# Patient Record
Sex: MALE | Race: WHITE | NOT HISPANIC OR LATINO | Employment: OTHER | URBAN - METROPOLITAN AREA
[De-identification: names, ages, dates, MRNs, and addresses within clinical notes are randomized per-mention and may not be internally consistent; named-entity substitution may affect disease eponyms.]

---

## 2021-12-03 ENCOUNTER — HOSPITAL ENCOUNTER (OUTPATIENT)
Dept: GENERAL RADIOLOGY | Facility: HOSPITAL | Age: 56
Discharge: HOME | End: 2021-12-03
Admitting: STUDENT IN AN ORGANIZED HEALTH CARE EDUCATION/TRAINING PROGRAM

## 2021-12-03 ENCOUNTER — OFFICE VISIT (OUTPATIENT)
Dept: PAIN MEDICINE | Facility: CLINIC | Age: 56
End: 2021-12-03

## 2021-12-03 VITALS
DIASTOLIC BLOOD PRESSURE: 96 MMHG | BODY MASS INDEX: 24.99 KG/M2 | OXYGEN SATURATION: 97 % | RESPIRATION RATE: 16 BRPM | HEIGHT: 65 IN | HEART RATE: 68 BPM | WEIGHT: 150 LBS | TEMPERATURE: 97.3 F | SYSTOLIC BLOOD PRESSURE: 169 MMHG

## 2021-12-03 DIAGNOSIS — M54.41 CHRONIC BILATERAL LOW BACK PAIN WITH BILATERAL SCIATICA: ICD-10-CM

## 2021-12-03 DIAGNOSIS — M54.42 CHRONIC BILATERAL LOW BACK PAIN WITH BILATERAL SCIATICA: ICD-10-CM

## 2021-12-03 DIAGNOSIS — G89.29 CHRONIC BILATERAL LOW BACK PAIN WITH BILATERAL SCIATICA: ICD-10-CM

## 2021-12-03 DIAGNOSIS — Z79.899 MEDICATION MANAGEMENT: Primary | ICD-10-CM

## 2021-12-03 PROCEDURE — G0463 HOSPITAL OUTPT CLINIC VISIT: HCPCS | Performed by: STUDENT IN AN ORGANIZED HEALTH CARE EDUCATION/TRAINING PROGRAM

## 2021-12-03 PROCEDURE — 99204 OFFICE O/P NEW MOD 45 MIN: CPT | Performed by: STUDENT IN AN ORGANIZED HEALTH CARE EDUCATION/TRAINING PROGRAM

## 2021-12-03 PROCEDURE — 72100 X-RAY EXAM L-S SPINE 2/3 VWS: CPT

## 2021-12-03 RX ORDER — MORPHINE SULFATE 15 MG/1
15 TABLET, FILM COATED, EXTENDED RELEASE ORAL 3 TIMES DAILY
COMMUNITY
Start: 2021-11-09 | End: 2021-12-03

## 2021-12-03 RX ORDER — BUPRENORPHINE 7.5 UG/H
1 PATCH TRANSDERMAL
Qty: 4 PATCH | Refills: 0 | Status: SHIPPED | OUTPATIENT
Start: 2021-12-03 | End: 2021-12-29 | Stop reason: SDUPTHER

## 2021-12-06 ENCOUNTER — TELEPHONE (OUTPATIENT)
Dept: PAIN MEDICINE | Facility: CLINIC | Age: 56
End: 2021-12-06

## 2021-12-08 NOTE — PROGRESS NOTES
CHIEF COMPLAINT  Chief Complaint   Patient presents with   • Back Pain     MORPHINE LAST DOSE 12/3/21 0800.        Primary Care  Deni Mahoney APRN Subjective Raymond V Tristian is a 56 y.o. male  who presents for chronic low back pain and bilateral lumbar radiculopathy.  He states that he is on the pain ongoing for many years following a work injury.  He states that since that time, he has been followed by an outside pain clinic who has started him on multiple medications including Percocet and most recently, MS Contin.  He states that while he does get some relief with his current pain regimen, he feels like he has less benefit than when he was getting fentanyl patches.  He states that he was told by his previous pain provider that he could no longer get the fentanyl due to supply issues.  He presents today for establishment of care and further work-up management.  He denies any red flag symptoms, but does admit to generalized weakness in the lower extremities bilaterally.    History of Present Illness     Location: Low back and bilateral lower extremities  Onset: Many years ago  Duration: Progressively worsening  Timing: Constant throughout the day  Quality: Sharp, stabbing pains with associated weakness  Severity: Today: 9       Last Week: 9       Worst: 10  Modifying Factors: The pain is worse with any physical activity and movement and improves with rest    Physical Therapy: no    Interval Update 12/03/2021:     The following portions of the patient's history were reviewed and updated as appropriate: allergies, current medications, past family history, past medical history, past social history, past surgical history and problem list.      Current Outpatient Medications:   •  Buprenorphine (Butrans) patch weekly transdermal patch, Place 1 patch on the skin as directed by provider Every 7 (Seven) Days., Disp: 4 patch, Rfl: 0    Review of Systems   Gastrointestinal: Negative for constipation.  "  Musculoskeletal: Positive for back pain and gait problem.   Neurological: Positive for weakness. Negative for numbness.       Vitals:    12/03/21 1505   BP: 169/96   Pulse: 68   Resp: 16   Temp: 97.3 °F (36.3 °C)   SpO2: 97%   Weight: 68 kg (150 lb)   Height: 165.1 cm (65\")   PainSc:   9       Urine Drug Screen: 12/03/2021  Appropriate: Pending    Objective   Physical Exam  Vitals and nursing note reviewed. Exam conducted with a chaperone present.   Constitutional:       General: He is not in acute distress.     Appearance: Normal appearance. He is well-developed and normal weight.   Neck:      Trachea: No tracheal deviation.   Musculoskeletal:      Comments: Lumbar Spine Exam:  Tender to palpation over the lumbar paraspinal musculature No  Limited range of motion secondary to pain No  Facet loading positive: Equivocal  Facets tender to palpation: Equivocal  Straight leg raise test positive: Equivocal       Neurological:      General: No focal deficit present.      Mental Status: He is alert.      Sensory: No sensory deficit.      Motor: Weakness present.      Comments: Generalized global weakness           Assessment/Plan   Problems Addressed this Visit     None      Visit Diagnoses     Medication management    -  Primary    Relevant Medications    Buprenorphine (Butrans) patch weekly transdermal patch    Other Relevant Orders    Urine Drug Screen - Urine, Clean Catch    Chronic bilateral low back pain with bilateral sciatica        Relevant Medications    Buprenorphine (Butrans) patch weekly transdermal patch    Other Relevant Orders    XR Spine Lumbar 2 or 3 View (Completed)      Diagnoses       Codes Comments    Medication management    -  Primary ICD-10-CM: Z79.899  ICD-9-CM: V58.69     Chronic bilateral low back pain with bilateral sciatica     ICD-10-CM: M54.42, M54.41, G89.29  ICD-9-CM: 724.2, 724.3, 338.29           Plan:  1. We will obtain plain films of the lumbar spine to assess for his current " pathology  2. He was previously established with outside pain clinic and was receiving it combination of different medications with varying benefit  3. It does not appear that he has ever received any formal interventional therapy or neurosurgical work-up  4. Following the plain films, may consider MRI of the lumbar spine to better assess for his pathology.  Given his weakness, I am somewhat concerned of either spinal stenosis versus nerve root impingement  5. He states in the past, he received good benefit with fentanyl patches.  Ideally, I would like to minimize his narcotic burden as it does not appear that he has had any interventional techniques  6. We will try him on Butrans patches pending the insurance approval.  If he gets good relief with Butrans patches, we can discontinue the morphine and maintain on Butrans patch that we can get a further working diagnosis  7. UDS and contract today  8. Inspect appropriate  --- Follow-up 1 month           INSPECT REPORT    As part of the patient's treatment plan, I may be prescribing controlled substances. The patient has been made aware of appropriate use of such medications, including potential risk of somnolence, limited ability to drive and/or work safely, and the potential for dependence or overdose. It has also bee made clear that these medications are for use by this patient only, without concomitant use of alcohol or other substances unless prescribed.     Patient has completed prescribing agreement detailing terms of continued prescribing of controlled substances, including monitoring HANNY reports, urine drug screening, and pill counts if necessary. The patient is aware that inappropriate use will results in cessation of prescribing such medications.    INSPECT report has been reviewed and scanned into the patient's chart.    As the clinician, I personally reviewed the INSPECT from 12/3/2021.    History and physical exam exhibit continued safe and appropriate  use of controlled substances.      EMR Dragon/Transcription disclaimer:   Much of this encounter note is an electronic transcription/translation of spoken language to printed text. The electronic translation of spoken language may permit erroneous, or at times, nonsensical words or phrases to be inadvertently transcribed; Although I have reviewed the note for such errors, some may still exist.

## 2021-12-29 DIAGNOSIS — G89.29 CHRONIC BILATERAL LOW BACK PAIN WITH BILATERAL SCIATICA: ICD-10-CM

## 2021-12-29 DIAGNOSIS — M54.41 CHRONIC BILATERAL LOW BACK PAIN WITH BILATERAL SCIATICA: ICD-10-CM

## 2021-12-29 DIAGNOSIS — Z79.899 MEDICATION MANAGEMENT: ICD-10-CM

## 2021-12-29 DIAGNOSIS — M54.42 CHRONIC BILATERAL LOW BACK PAIN WITH BILATERAL SCIATICA: ICD-10-CM

## 2021-12-29 RX ORDER — BUPRENORPHINE 7.5 UG/H
1 PATCH TRANSDERMAL
Qty: 4 PATCH | Refills: 0 | Status: SHIPPED | OUTPATIENT
Start: 2021-12-29 | End: 2022-02-25

## 2022-01-14 ENCOUNTER — OFFICE VISIT (OUTPATIENT)
Dept: PAIN MEDICINE | Facility: CLINIC | Age: 57
End: 2022-01-14

## 2022-01-14 ENCOUNTER — TELEPHONE (OUTPATIENT)
Dept: PAIN MEDICINE | Facility: CLINIC | Age: 57
End: 2022-01-14

## 2022-01-14 VITALS
BODY MASS INDEX: 24.99 KG/M2 | HEIGHT: 65 IN | OXYGEN SATURATION: 97 % | SYSTOLIC BLOOD PRESSURE: 139 MMHG | DIASTOLIC BLOOD PRESSURE: 85 MMHG | WEIGHT: 150 LBS | HEART RATE: 66 BPM | RESPIRATION RATE: 16 BRPM

## 2022-01-14 DIAGNOSIS — Z79.899 MEDICATION MANAGEMENT: ICD-10-CM

## 2022-01-14 DIAGNOSIS — M54.42 CHRONIC BILATERAL LOW BACK PAIN WITH BILATERAL SCIATICA: ICD-10-CM

## 2022-01-14 DIAGNOSIS — M54.41 CHRONIC BILATERAL LOW BACK PAIN WITH BILATERAL SCIATICA: ICD-10-CM

## 2022-01-14 DIAGNOSIS — G89.29 CHRONIC BILATERAL LOW BACK PAIN WITH BILATERAL SCIATICA: ICD-10-CM

## 2022-01-14 PROCEDURE — 99214 OFFICE O/P EST MOD 30 MIN: CPT | Performed by: STUDENT IN AN ORGANIZED HEALTH CARE EDUCATION/TRAINING PROGRAM

## 2022-01-14 PROCEDURE — G0463 HOSPITAL OUTPT CLINIC VISIT: HCPCS | Performed by: STUDENT IN AN ORGANIZED HEALTH CARE EDUCATION/TRAINING PROGRAM

## 2022-01-14 RX ORDER — ASPIRIN 325 MG
325 TABLET ORAL DAILY
COMMUNITY

## 2022-01-14 RX ORDER — BUPRENORPHINE 10 UG/H
1 PATCH TRANSDERMAL WEEKLY
Qty: 4 PATCH | Refills: 0 | Status: SHIPPED | OUTPATIENT
Start: 2022-02-03 | End: 2022-01-18 | Stop reason: SDUPTHER

## 2022-01-14 NOTE — TELEPHONE ENCOUNTER
Caller: JESSICA     Relationship to patient: [facility]     Best call back number: [phone]    Patient is needing: ATTEMPTED TO WARM TRANSFER- JESSICA CALLED REGARDING A PRESCRIPTION  PHARMACY RECEIVED; STATED OF A DIFFERENT STRENGTH & HAD  QUESTIONS-  PLEASE RETURN CALL

## 2022-01-14 NOTE — PROGRESS NOTES
CHIEF COMPLAINT  Chief Complaint   Patient presents with   • Back Pain     Butrans 1/13 morning       Primary Care  Deni Mahoney, LOAN Rollins V Tristian is a 56 y.o. male  who presents for chronic low back pain and bilateral lumbar radiculopathy.  He states that he is on the pain ongoing for many years following a work injury.  He states that since that time, he has been followed by an outside pain clinic who has started him on multiple medications including Percocet and most recently, MS Contin.  He states that while he does get some relief with his current pain regimen, he feels like he has less benefit than when he was getting fentanyl patches.  He states that he was told by his previous pain provider that he could no longer get the fentanyl due to supply issues.  He presents today for establishment of care and further work-up management.  He denies any red flag symptoms, but does admit to generalized weakness in the lower extremities bilaterally.    Back Pain  Associated symptoms include weakness. Pertinent negatives include no numbness.        Location: Low back and bilateral lower extremities  Onset: Many years ago  Duration: Progressively worsening  Timing: Constant throughout the day  Quality: Sharp, stabbing pains with associated weakness  Severity: Today: 9       Last Week: 9       Worst: 10  Modifying Factors: The pain is worse with any physical activity and movement and improves with rest    Physical Therapy: no    Interval Update 01/14/2022: He reports doing much better with the Butrans patch over the morphine.  Denies side effects of sedation constipation.    The following portions of the patient's history were reviewed and updated as appropriate: allergies, current medications, past family history, past medical history, past social history, past surgical history and problem list.      Current Outpatient Medications:   •  aspirin 325 MG tablet, Take 325 mg by mouth Daily., Disp: , Rfl:  "  •  Buprenorphine (Butrans) patch weekly transdermal patch, Place 1 patch on the skin as directed by provider Every 7 (Seven) Days., Disp: 4 patch, Rfl: 0  •  [START ON 2/3/2022] Buprenorphine (Butrans) 10 MCG/HR patch weekly, Place 1 patch on the skin as directed by provider 1 (One) Time Per Week., Disp: 4 patch, Rfl: 0    Review of Systems   Gastrointestinal: Negative for constipation.   Musculoskeletal: Positive for back pain and gait problem.   Neurological: Positive for weakness. Negative for numbness.       Vitals:    01/14/22 1405   BP: 139/85   Pulse: 66   Resp: 16   SpO2: 97%   Weight: 68 kg (150 lb)   Height: 165.1 cm (65\")   PainSc:   8       Urine Drug Screen: 12/03/2021  Appropriate: Yes    Objective   Physical Exam  Vitals and nursing note reviewed. Exam conducted with a chaperone present.   Constitutional:       General: He is not in acute distress.     Appearance: Normal appearance. He is well-developed and normal weight.   Neck:      Trachea: No tracheal deviation.   Musculoskeletal:      Comments: Lumbar Spine Exam:  Tender to palpation over the lumbar paraspinal musculature No  Limited range of motion secondary to pain No  Facet loading positive: Equivocal  Facets tender to palpation: Equivocal  Straight leg raise test positive: Equivocal       Neurological:      General: No focal deficit present.      Mental Status: He is alert.      Sensory: No sensory deficit.      Motor: Weakness present.      Comments: Generalized global weakness           Assessment/Plan   Problems Addressed this Visit     None      Visit Diagnoses     Medication management        Chronic bilateral low back pain with bilateral sciatica        Relevant Medications    Buprenorphine (Butrans) 10 MCG/HR patch weekly (Start on 2/3/2022)    Other Relevant Orders    MRI Lumbar Spine Without Contrast      Diagnoses       Codes Comments    Medication management     ICD-10-CM: Z79.899  ICD-9-CM: V58.69     Chronic bilateral low back " pain with bilateral sciatica     ICD-10-CM: M54.42, M54.41, G89.29  ICD-9-CM: 724.2, 724.3, 338.29           Plan:  1. His lumbar plain films are fairly unremarkable  2. Order lumbar MRI  3. Increase Butrans patch to 10 mcg/h  4. Follow-up in 1 month  --- Follow-up 4 to 6 weeks           INSPECT REPORT    As part of the patient's treatment plan, I may be prescribing controlled substances. The patient has been made aware of appropriate use of such medications, including potential risk of somnolence, limited ability to drive and/or work safely, and the potential for dependence or overdose. It has also bee made clear that these medications are for use by this patient only, without concomitant use of alcohol or other substances unless prescribed.     Patient has completed prescribing agreement detailing terms of continued prescribing of controlled substances, including monitoring HANNY reports, urine drug screening, and pill counts if necessary. The patient is aware that inappropriate use will results in cessation of prescribing such medications.    INSPECT report has been reviewed and scanned into the patient's chart.    As the clinician, I personally reviewed the INSPECT from 1/12/2022.    History and physical exam exhibit continued safe and appropriate use of controlled substances.      EMR Dragon/Transcription disclaimer:   Much of this encounter note is an electronic transcription/translation of spoken language to printed text. The electronic translation of spoken language may permit erroneous, or at times, nonsensical words or phrases to be inadvertently transcribed; Although I have reviewed the note for such errors, some may still exist.

## 2022-01-17 ENCOUNTER — TELEPHONE (OUTPATIENT)
Dept: PAIN MEDICINE | Facility: CLINIC | Age: 57
End: 2022-01-17

## 2022-01-17 NOTE — TELEPHONE ENCOUNTER
Caller: KIMBERLY GAY     Relationship to patient: WIFE     Best call back number: [phone]    Patient is needing: PATIENTS WIFE CALLED AND STATED THAT THE PAIN PATCH( Buprenorphine (Butrans) 10 MCG/HR patch weekly) WOULD NEED TO BE FILLED ON 02/02/2022 INSTEAD OF 02/03/2022 SO THE PATIENT DOES NOT RUN OUT OF MEDICATION. ATTEMPTED TO WARM TRANSFER

## 2022-01-17 NOTE — TELEPHONE ENCOUNTER
Spoke with wife wanting to fill a day early since pharmacy is [place] miles from where they live and patient puts patch on at 9:30 AM. Wife would not have time to get patch and put on that AM with pharmacy being so far from house.

## 2022-01-18 DIAGNOSIS — G89.29 CHRONIC BILATERAL LOW BACK PAIN WITH BILATERAL SCIATICA: ICD-10-CM

## 2022-01-18 DIAGNOSIS — M54.42 CHRONIC BILATERAL LOW BACK PAIN WITH BILATERAL SCIATICA: ICD-10-CM

## 2022-01-18 DIAGNOSIS — M54.41 CHRONIC BILATERAL LOW BACK PAIN WITH BILATERAL SCIATICA: ICD-10-CM

## 2022-01-18 RX ORDER — BUPRENORPHINE 10 UG/H
1 PATCH TRANSDERMAL WEEKLY
Qty: 4 PATCH | Refills: 0 | Status: SHIPPED | OUTPATIENT
Start: 2022-02-02 | End: 2022-02-25

## 2022-02-09 ENCOUNTER — HOSPITAL ENCOUNTER (OUTPATIENT)
Dept: MRI IMAGING | Facility: HOSPITAL | Age: 57
Discharge: HOME | End: 2022-02-09
Admitting: STUDENT IN AN ORGANIZED HEALTH CARE EDUCATION/TRAINING PROGRAM

## 2022-02-09 DIAGNOSIS — G89.29 CHRONIC BILATERAL LOW BACK PAIN WITH BILATERAL SCIATICA: ICD-10-CM

## 2022-02-09 DIAGNOSIS — M54.41 CHRONIC BILATERAL LOW BACK PAIN WITH BILATERAL SCIATICA: ICD-10-CM

## 2022-02-09 DIAGNOSIS — M54.42 CHRONIC BILATERAL LOW BACK PAIN WITH BILATERAL SCIATICA: ICD-10-CM

## 2022-02-09 PROCEDURE — 72148 MRI LUMBAR SPINE W/O DYE: CPT

## 2022-02-25 ENCOUNTER — OFFICE VISIT (OUTPATIENT)
Dept: PAIN MEDICINE | Facility: CLINIC | Age: 57
End: 2022-02-25

## 2022-02-25 VITALS
HEART RATE: 77 BPM | TEMPERATURE: 97.1 F | SYSTOLIC BLOOD PRESSURE: 151 MMHG | DIASTOLIC BLOOD PRESSURE: 91 MMHG | OXYGEN SATURATION: 98 % | RESPIRATION RATE: 16 BRPM

## 2022-02-25 DIAGNOSIS — M48.062 SPINAL STENOSIS OF LUMBAR REGION WITH NEUROGENIC CLAUDICATION: Primary | ICD-10-CM

## 2022-02-25 PROCEDURE — 99214 OFFICE O/P EST MOD 30 MIN: CPT | Performed by: STUDENT IN AN ORGANIZED HEALTH CARE EDUCATION/TRAINING PROGRAM

## 2022-02-25 RX ORDER — BUPRENORPHINE 15 UG/H
1 PATCH TRANSDERMAL WEEKLY
Qty: 4 PATCH | Refills: 0 | Status: SHIPPED | OUTPATIENT
Start: 2022-04-27 | End: 2022-05-20 | Stop reason: SDUPTHER

## 2022-02-25 RX ORDER — BUPRENORPHINE 15 UG/H
1 PATCH TRANSDERMAL WEEKLY
Qty: 4 PATCH | Refills: 0 | Status: SHIPPED | OUTPATIENT
Start: 2022-03-02 | End: 2022-05-20 | Stop reason: SDUPTHER

## 2022-02-25 RX ORDER — BUPRENORPHINE 15 UG/H
1 PATCH TRANSDERMAL WEEKLY
Qty: 4 PATCH | Refills: 0 | Status: SHIPPED | OUTPATIENT
Start: 2022-03-30 | End: 2022-05-20 | Stop reason: SDUPTHER

## 2022-02-25 NOTE — PROGRESS NOTES
CHIEF COMPLAINT  Chief Complaint   Patient presents with   • Back Pain     butrans 0930 yesterday       Primary Care  Deni Mahoney, LOAN Rollins V Tristian is a 56 y.o. male  who presents for chronic low back pain and bilateral lumbar radiculopathy.  He states that he is on the pain ongoing for many years following a work injury.  He states that since that time, he has been followed by an outside pain clinic who has started him on multiple medications including Percocet and most recently, MS Contin.  He states that while he does get some relief with his current pain regimen, he feels like he has less benefit than when he was getting fentanyl patches.  He states that he was told by his previous pain provider that he could no longer get the fentanyl due to supply issues.  He presents today for establishment of care and further work-up management.  He denies any red flag symptoms, but does admit to generalized weakness in the lower extremities bilaterally.    Back Pain  Associated symptoms include weakness. Pertinent negatives include no numbness.        Location: Low back and bilateral lower extremities  Onset: Many years ago  Duration: Progressively worsening  Timing: Constant throughout the day  Quality: Sharp, stabbing pains with associated weakness  Severity: Today: 9       Last Week: 9       Worst: 10  Modifying Factors: The pain is worse with any physical activity and movement and improves with rest    Physical Therapy: no    Interval Update 02/25/2022: He continues to do very well with Butrans patch.  Is having minimal breakthrough pain.  Much better symptom control.    The following portions of the patient's history were reviewed and updated as appropriate: allergies, current medications, past family history, past medical history, past social history, past surgical history and problem list.      Current Outpatient Medications:   •  aspirin 325 MG tablet, Take 325 mg by mouth Daily., Disp: ,  Rfl:   •  [START ON 3/2/2022] Buprenorphine (Butrans) 15 MCG/HR patch weekly, Place 1 patch on the skin as directed by provider 1 (One) Time Per Week., Disp: 4 patch, Rfl: 0  •  [START ON 3/30/2022] Buprenorphine (Butrans) 15 MCG/HR patch weekly, Place 1 patch on the skin as directed by provider 1 (One) Time Per Week., Disp: 4 patch, Rfl: 0  •  [START ON 4/27/2022] Buprenorphine (Butrans) 15 MCG/HR patch weekly, Place 1 patch on the skin as directed by provider 1 (One) Time Per Week., Disp: 4 patch, Rfl: 0    Review of Systems   Gastrointestinal: Negative for constipation.   Musculoskeletal: Positive for back pain and gait problem.   Neurological: Positive for weakness. Negative for numbness.       Vitals:    02/25/22 1357   BP: 151/91   Pulse: 77   Resp: 16   Temp: 97.1 °F (36.2 °C)   SpO2: 98%   PainSc:   8       Urine Drug Screen: 12/03/2021  Appropriate: Yes    Objective   Physical Exam  Vitals and nursing note reviewed. Exam conducted with a chaperone present.   Constitutional:       General: He is not in acute distress.     Appearance: Normal appearance. He is well-developed and normal weight.   Neck:      Trachea: No tracheal deviation.   Musculoskeletal:      Comments: Lumbar Spine Exam:  Tender to palpation over the lumbar paraspinal musculature No  Limited range of motion secondary to pain No  Facet loading positive: Equivocal  Facets tender to palpation: Equivocal  Straight leg raise test positive: Equivocal       Neurological:      General: No focal deficit present.      Mental Status: He is alert.      Sensory: No sensory deficit.      Motor: Weakness present.      Comments: Generalized global weakness           Assessment/Plan   Problems Addressed this Visit     None      Visit Diagnoses     Spinal stenosis of lumbar region with neurogenic claudication    -  Primary    Relevant Medications    Buprenorphine (Butrans) 15 MCG/HR patch weekly (Start on 3/2/2022)    Buprenorphine (Butrans) 15 MCG/HR patch  weekly (Start on 3/30/2022)    Buprenorphine (Butrans) 15 MCG/HR patch weekly (Start on 4/27/2022)      Diagnoses       Codes Comments    Spinal stenosis of lumbar region with neurogenic claudication    -  Primary ICD-10-CM: M48.062  ICD-9-CM: 724.03           Plan:  1. His lumbar plain films are fairly unremarkable  2. MRI review shows lower lumbar central and foraminal stenosis but is otherwise fairly unremarkable  3. Increase Butrans patch 1 final time to 15 mcg/h  4. Follow-up 3 months  --- Follow-up 3 months           INSPECT REPORT    As part of the patient's treatment plan, I may be prescribing controlled substances. The patient has been made aware of appropriate use of such medications, including potential risk of somnolence, limited ability to drive and/or work safely, and the potential for dependence or overdose. It has also bee made clear that these medications are for use by this patient only, without concomitant use of alcohol or other substances unless prescribed.     Patient has completed prescribing agreement detailing terms of continued prescribing of controlled substances, including monitoring HANNY reports, urine drug screening, and pill counts if necessary. The patient is aware that inappropriate use will results in cessation of prescribing such medications.    INSPECT report has been reviewed and scanned into the patient's chart.    As the clinician, I personally reviewed the INSPECT from 2/23/2022.    History and physical exam exhibit continued safe and appropriate use of controlled substances.      EMR Dragon/Transcription disclaimer:   Much of this encounter note is an electronic transcription/translation of spoken language to printed text. The electronic translation of spoken language may permit erroneous, or at times, nonsensical words or phrases to be inadvertently transcribed; Although I have reviewed the note for such errors, some may still exist.

## 2022-03-30 ENCOUNTER — TELEPHONE (OUTPATIENT)
Dept: PAIN MEDICINE | Facility: CLINIC | Age: 57
End: 2022-03-30

## 2022-03-30 NOTE — TELEPHONE ENCOUNTER
PA. Sent waiting on insurance approval.   patient found on the floor by the bed by staff- send in to be evealuated

## 2022-03-30 NOTE — TELEPHONE ENCOUNTER
Caller: JESSICA    Relationship to patient: [facility]    Best call back number: [phone]    Patient is needing: JESSICA WITH [facility] WAS CALLING TO LET DR. FIGUEROA KNOW THAT PRIOR AUTHORIZATION IS NOW NEEDED FOR PATIENT'S PRESCRIPTION OF BUPRENOPHINE 15 MG. JESSICA STATED SHE FAXED THIS INFORMATION OVER AS WELL BUT THE PRESCRIPTION IS DUE TO BE REFILLED TODAY SO SHE WANTED TO CALL US AS WELL TO INFORM ABOUT THE PRIOR AUTHORIZATION. I TRIED TO WARM TRANSFER CALL BUT RECEIVED NO ANSWER. THANK YOU!

## 2022-04-21 ENCOUNTER — TELEPHONE (OUTPATIENT)
Dept: PAIN MEDICINE | Facility: CLINIC | Age: 57
End: 2022-04-21

## 2022-04-21 NOTE — TELEPHONE ENCOUNTER
Caller: KIMBERLY     Relationship: SPOUSE    Best call back number: [phone]    Requested Prescriptions: BUPRENORPHINE 15 MG  PATCHES    Pharmacy where request should be sent:  [facility] IN [place], [place]    Additional details provided by patient: PATIENT'S SPOUSE, KIMBERLY WAS CALLING TO REQUEST A REFILL ON PATIENT'S PRESCRIPTION OF BUPRENORPHINE 15 MG PATCHES.     Does the patient have less than a 3 day supply:  [] Yes  [x] No

## 2022-05-20 ENCOUNTER — OFFICE VISIT (OUTPATIENT)
Dept: PAIN MEDICINE | Facility: CLINIC | Age: 57
End: 2022-05-20

## 2022-05-20 VITALS
HEART RATE: 73 BPM | HEIGHT: 65 IN | SYSTOLIC BLOOD PRESSURE: 149 MMHG | DIASTOLIC BLOOD PRESSURE: 92 MMHG | BODY MASS INDEX: 24.99 KG/M2 | OXYGEN SATURATION: 97 % | RESPIRATION RATE: 16 BRPM | WEIGHT: 150 LBS

## 2022-05-20 DIAGNOSIS — M48.062 SPINAL STENOSIS OF LUMBAR REGION WITH NEUROGENIC CLAUDICATION: ICD-10-CM

## 2022-05-20 PROCEDURE — G0463 HOSPITAL OUTPT CLINIC VISIT: HCPCS | Performed by: STUDENT IN AN ORGANIZED HEALTH CARE EDUCATION/TRAINING PROGRAM

## 2022-05-20 PROCEDURE — 99214 OFFICE O/P EST MOD 30 MIN: CPT | Performed by: STUDENT IN AN ORGANIZED HEALTH CARE EDUCATION/TRAINING PROGRAM

## 2022-05-20 RX ORDER — BUPRENORPHINE 15 UG/H
1 PATCH TRANSDERMAL WEEKLY
Qty: 4 PATCH | Refills: 0 | Status: SHIPPED | OUTPATIENT
Start: 2022-06-22 | End: 2022-08-12 | Stop reason: SDUPTHER

## 2022-05-20 RX ORDER — BUPRENORPHINE 15 UG/H
1 PATCH TRANSDERMAL WEEKLY
Qty: 4 PATCH | Refills: 0 | Status: SHIPPED | OUTPATIENT
Start: 2022-05-25 | End: 2022-08-12 | Stop reason: SDUPTHER

## 2022-05-20 RX ORDER — BUPRENORPHINE 15 UG/H
1 PATCH TRANSDERMAL WEEKLY
Qty: 4 PATCH | Refills: 0 | Status: SHIPPED | OUTPATIENT
Start: 2022-07-20 | End: 2022-08-12 | Stop reason: SDUPTHER

## 2022-05-20 NOTE — PROGRESS NOTES
CHIEF COMPLAINT  Chief Complaint   Patient presents with   • Back Pain     Butrans patch 5/19       Primary Care  Deni Mahoney, LOAN Rollins V Tristian is a 56 y.o. male  who presents for chronic low back pain and bilateral lumbar radiculopathy.  He states that he is on the pain ongoing for many years following a work injury.  He states that since that time, he has been followed by an outside pain clinic who has started him on multiple medications including Percocet and most recently, MS Contin.  He states that while he does get some relief with his current pain regimen, he feels like he has less benefit than when he was getting fentanyl patches.  He states that he was told by his previous pain provider that he could no longer get the fentanyl due to supply issues.  He presents today for establishment of care and further work-up management.  He denies any red flag symptoms, but does admit to generalized weakness in the lower extremities bilaterally.    Back Pain  Associated symptoms include weakness. Pertinent negatives include no numbness.        Location: Low back and bilateral lower extremities  Onset: Many years ago  Duration: Progressively worsening  Timing: Constant throughout the day  Quality: Sharp, stabbing pains with associated weakness  Severity: Today: 6       Last Week: 6       Worst: 10  Modifying Factors: The pain is worse with any physical activity and movement and improves with rest    Physical Therapy: no    Interval Update 05/20/2022: He continues to do very well with Butrans patch.  Is having minimal breakthrough pain.  Much better symptom control.    The following portions of the patient's history were reviewed and updated as appropriate: allergies, current medications, past family history, past medical history, past social history, past surgical history and problem list.      Current Outpatient Medications:   •  aspirin 325 MG tablet, Take 325 mg by mouth Daily., Disp: , Rfl:  "  •  [START ON 5/25/2022] Buprenorphine (Butrans) 15 MCG/HR patch weekly, Place 1 patch on the skin as directed by provider 1 (One) Time Per Week., Disp: 4 patch, Rfl: 0  •  [START ON 6/22/2022] Buprenorphine (Butrans) 15 MCG/HR patch weekly, Place 1 patch on the skin as directed by provider 1 (One) Time Per Week., Disp: 4 patch, Rfl: 0  •  [START ON 7/20/2022] Buprenorphine (Butrans) 15 MCG/HR patch weekly, Place 1 patch on the skin as directed by provider 1 (One) Time Per Week., Disp: 4 patch, Rfl: 0    Review of Systems   Gastrointestinal: Negative for constipation.   Musculoskeletal: Positive for back pain and gait problem.   Neurological: Positive for weakness. Negative for numbness.       Vitals:    05/20/22 1413   BP: 149/92   Pulse: 73   Resp: 16   SpO2: 97%   Weight: 68 kg (150 lb)   Height: 165.1 cm (65\")   PainSc:   6       Urine Drug Screen: 12/03/2021  Appropriate: Yes    Objective   Physical Exam  Vitals and nursing note reviewed. Exam conducted with a chaperone present.   Constitutional:       General: He is not in acute distress.     Appearance: Normal appearance. He is well-developed and normal weight.   Neck:      Trachea: No tracheal deviation.   Musculoskeletal:      Comments: Lumbar Spine Exam:  Tender to palpation over the lumbar paraspinal musculature No  Limited range of motion secondary to pain No  Facet loading positive: Equivocal  Facets tender to palpation: Equivocal  Straight leg raise test positive: Equivocal       Neurological:      General: No focal deficit present.      Mental Status: He is alert.      Sensory: No sensory deficit.      Motor: Weakness present.      Comments: Generalized global weakness           Assessment & Plan   Problems Addressed this Visit    None     Visit Diagnoses     Spinal stenosis of lumbar region with neurogenic claudication        Relevant Medications    Buprenorphine (Butrans) 15 MCG/HR patch weekly (Start on 5/25/2022)    Buprenorphine (Butrans) 15 " MCG/HR patch weekly (Start on 6/22/2022)    Buprenorphine (Butrans) 15 MCG/HR patch weekly (Start on 7/20/2022)      Diagnoses       Codes Comments    Spinal stenosis of lumbar region with neurogenic claudication     ICD-10-CM: M48.062  ICD-9-CM: 724.03           Plan:  1. His lumbar plain films are fairly unremarkable  2. MRI review shows lower lumbar central and foraminal stenosis but is otherwise fairly unremarkable  3. Continue Butrans at 50 mcg/h  4. Follow-up 3 months  --- Follow-up 3 months           INSPECT REPORT    As part of the patient's treatment plan, I may be prescribing controlled substances. The patient has been made aware of appropriate use of such medications, including potential risk of somnolence, limited ability to drive and/or work safely, and the potential for dependence or overdose. It has also bee made clear that these medications are for use by this patient only, without concomitant use of alcohol or other substances unless prescribed.     Patient has completed prescribing agreement detailing terms of continued prescribing of controlled substances, including monitoring HANNY reports, urine drug screening, and pill counts if necessary. The patient is aware that inappropriate use will results in cessation of prescribing such medications.    INSPECT report has been reviewed and scanned into the patient's chart.    As the clinician, I personally reviewed the INSPECT from 5/18/2022.    History and physical exam exhibit continued safe and appropriate use of controlled substances.      EMR Dragon/Transcription disclaimer:   Much of this encounter note is an electronic transcription/translation of spoken language to printed text. The electronic translation of spoken language may permit erroneous, or at times, nonsensical words or phrases to be inadvertently transcribed; Although I have reviewed the note for such errors, some may still exist.

## 2022-08-12 ENCOUNTER — OFFICE VISIT (OUTPATIENT)
Dept: PAIN MEDICINE | Facility: CLINIC | Age: 57
End: 2022-08-12

## 2022-08-12 VITALS
RESPIRATION RATE: 16 BRPM | SYSTOLIC BLOOD PRESSURE: 146 MMHG | DIASTOLIC BLOOD PRESSURE: 84 MMHG | BODY MASS INDEX: 24.99 KG/M2 | HEART RATE: 60 BPM | WEIGHT: 150 LBS | HEIGHT: 65 IN | OXYGEN SATURATION: 98 %

## 2022-08-12 DIAGNOSIS — M48.062 SPINAL STENOSIS OF LUMBAR REGION WITH NEUROGENIC CLAUDICATION: ICD-10-CM

## 2022-08-12 PROCEDURE — G0463 HOSPITAL OUTPT CLINIC VISIT: HCPCS | Performed by: STUDENT IN AN ORGANIZED HEALTH CARE EDUCATION/TRAINING PROGRAM

## 2022-08-12 PROCEDURE — 99214 OFFICE O/P EST MOD 30 MIN: CPT | Performed by: STUDENT IN AN ORGANIZED HEALTH CARE EDUCATION/TRAINING PROGRAM

## 2022-08-12 RX ORDER — BUPRENORPHINE 15 UG/H
1 PATCH TRANSDERMAL WEEKLY
Qty: 4 PATCH | Refills: 0 | Status: SHIPPED | OUTPATIENT
Start: 2022-10-14 | End: 2022-09-12 | Stop reason: SDUPTHER

## 2022-08-12 RX ORDER — BUPRENORPHINE 15 UG/H
1 PATCH TRANSDERMAL WEEKLY
Qty: 4 PATCH | Refills: 0 | Status: SHIPPED | OUTPATIENT
Start: 2022-09-16 | End: 2022-09-12 | Stop reason: SDUPTHER

## 2022-08-12 RX ORDER — LISINOPRIL 10 MG/1
TABLET ORAL
COMMUNITY
Start: 2022-07-19

## 2022-08-12 RX ORDER — BUPRENORPHINE 15 UG/H
1 PATCH TRANSDERMAL WEEKLY
Qty: 4 PATCH | Refills: 0 | Status: SHIPPED | OUTPATIENT
Start: 2022-08-18 | End: 2022-08-16 | Stop reason: SDUPTHER

## 2022-08-12 NOTE — PROGRESS NOTES
CHIEF COMPLAINT  Chief Complaint   Patient presents with   • Back Pain     Butrans patch LD 8/11/22       Primary Care  Deni Mahoney APRN Subjective Raymond V Tristian is a 57 y.o. male  who presents for chronic low back pain and bilateral lumbar radiculopathy.  He states that he is on the pain ongoing for many years following a work injury.  He states that since that time, he has been followed by an outside pain clinic who has started him on multiple medications including Percocet and most recently, MS Contin.  He states that while he does get some relief with his current pain regimen, he feels like he has less benefit than when he was getting fentanyl patches.  He states that he was told by his previous pain provider that he could no longer get the fentanyl due to supply issues.  He presents today for establishment of care and further work-up management.  He denies any red flag symptoms, but does admit to generalized weakness in the lower extremities bilaterally.    Back Pain  Associated symptoms include weakness. Pertinent negatives include no numbness.        Location: Low back and bilateral lower extremities  Onset: Many years ago  Duration: Progressively worsening  Timing: Constant throughout the day  Quality: Sharp, stabbing pains with associated weakness  Severity: Today: 6       Last Week: 6       Worst: 10  Modifying Factors: The pain is worse with any physical activity and movement and improves with rest    Physical Therapy: no    Interval Update 08/12/2022: He continues to get excellent pain relief with Butrans patch.  Denies constipation or sedation.    The following portions of the patient's history were reviewed and updated as appropriate: allergies, current medications, past family history, past medical history, past social history, past surgical history and problem list.      Current Outpatient Medications:   •  aspirin 325 MG tablet, Take 325 mg by mouth Daily., Disp: , Rfl:   •  [START ON  "10/14/2022] Buprenorphine (Butrans) 15 MCG/HR patch weekly, Place 1 patch on the skin as directed by provider 1 (One) Time Per Week., Disp: 4 patch, Rfl: 0  •  [START ON 9/16/2022] Buprenorphine (Butrans) 15 MCG/HR patch weekly, Place 1 patch on the skin as directed by provider 1 (One) Time Per Week., Disp: 4 patch, Rfl: 0  •  [START ON 8/18/2022] Buprenorphine (Butrans) 15 MCG/HR patch weekly, Place 1 patch on the skin as directed by provider 1 (One) Time Per Week., Disp: 4 patch, Rfl: 0  •  lisinopril (PRINIVIL,ZESTRIL) 10 MG tablet, , Disp: , Rfl:     Review of Systems   Gastrointestinal: Negative for constipation.   Musculoskeletal: Positive for back pain and gait problem.   Neurological: Positive for weakness. Negative for numbness.       Vitals:    08/12/22 1328   BP: 146/84   Pulse: 60   Resp: 16   SpO2: 98%   Weight: 68 kg (150 lb)   Height: 165.1 cm (65\")   PainSc:   7       Urine Drug Screen: 12/03/2021  Appropriate: Yes    Objective   Physical Exam  Vitals and nursing note reviewed. Exam conducted with a chaperone present.   Constitutional:       General: He is not in acute distress.     Appearance: Normal appearance. He is well-developed and normal weight.   Neck:      Trachea: No tracheal deviation.   Musculoskeletal:      Comments: Lumbar Spine Exam:  Tender to palpation over the lumbar paraspinal musculature No  Limited range of motion secondary to pain No  Facet loading positive: Equivocal  Facets tender to palpation: Equivocal  Straight leg raise test positive: Equivocal       Neurological:      General: No focal deficit present.      Mental Status: He is alert.      Sensory: No sensory deficit.      Motor: Weakness present.      Comments: Generalized global weakness           Assessment & Plan   Problems Addressed this Visit    None     Visit Diagnoses     Spinal stenosis of lumbar region with neurogenic claudication        Relevant Medications    Buprenorphine (Butrans) 15 MCG/HR patch weekly " (Start on 10/14/2022)    Buprenorphine (Butrans) 15 MCG/HR patch weekly (Start on 9/16/2022)    Buprenorphine (Butrans) 15 MCG/HR patch weekly (Start on 8/18/2022)      Diagnoses       Codes Comments    Spinal stenosis of lumbar region with neurogenic claudication     ICD-10-CM: M48.062  ICD-9-CM: 724.03           Plan:  1. His lumbar plain films are fairly unremarkable  2. MRI review shows lower lumbar central and foraminal stenosis but is otherwise fairly unremarkable  3. Continue Butrans at 50 mcg/h  4. Follow-up 3 months  --- Follow-up 3 months           INSPECT REPORT    As part of the patient's treatment plan, I may be prescribing controlled substances. The patient has been made aware of appropriate use of such medications, including potential risk of somnolence, limited ability to drive and/or work safely, and the potential for dependence or overdose. It has also bee made clear that these medications are for use by this patient only, without concomitant use of alcohol or other substances unless prescribed.     Patient has completed prescribing agreement detailing terms of continued prescribing of controlled substances, including monitoring HANNY reports, urine drug screening, and pill counts if necessary. The patient is aware that inappropriate use will results in cessation of prescribing such medications.    INSPECT report has been reviewed and scanned into the patient's chart.    As the clinician, I personally reviewed the INSPECT from 8/11/2021.    History and physical exam exhibit continued safe and appropriate use of controlled substances.      EMR Dragon/Transcription disclaimer:   Much of this encounter note is an electronic transcription/translation of spoken language to printed text. The electronic translation of spoken language may permit erroneous, or at times, nonsensical words or phrases to be inadvertently transcribed; Although I have reviewed the note for such errors, some may still exist.

## 2022-08-16 ENCOUNTER — TELEPHONE (OUTPATIENT)
Dept: PAIN MEDICINE | Facility: CLINIC | Age: 57
End: 2022-08-16

## 2022-08-16 DIAGNOSIS — M48.062 SPINAL STENOSIS OF LUMBAR REGION WITH NEUROGENIC CLAUDICATION: ICD-10-CM

## 2022-08-16 RX ORDER — BUPRENORPHINE 15 UG/H
1 PATCH TRANSDERMAL WEEKLY
Qty: 4 PATCH | Refills: 0 | Status: SHIPPED | OUTPATIENT
Start: 2022-08-17 | End: 2022-11-11

## 2022-09-12 ENCOUNTER — TELEPHONE (OUTPATIENT)
Dept: PAIN MEDICINE | Facility: CLINIC | Age: 57
End: 2022-09-12

## 2022-09-12 DIAGNOSIS — M48.062 SPINAL STENOSIS OF LUMBAR REGION WITH NEUROGENIC CLAUDICATION: ICD-10-CM

## 2022-09-12 RX ORDER — BUPRENORPHINE 15 UG/H
1 PATCH TRANSDERMAL WEEKLY
Qty: 4 PATCH | Refills: 0 | Status: SHIPPED | OUTPATIENT
Start: 2022-10-12 | End: 2022-11-03 | Stop reason: SDUPTHER

## 2022-09-12 RX ORDER — BUPRENORPHINE 15 UG/H
1 PATCH TRANSDERMAL WEEKLY
Qty: 4 PATCH | Refills: 0 | Status: SHIPPED | OUTPATIENT
Start: 2022-09-14 | End: 2022-11-11

## 2022-09-12 NOTE — TELEPHONE ENCOUNTER
Patient called needing his Butrans patch refill date changed. He states it is a 28 day patch prescription and pharmacy has it for every 30 days. Inspect in chart

## 2022-11-03 DIAGNOSIS — M48.062 SPINAL STENOSIS OF LUMBAR REGION WITH NEUROGENIC CLAUDICATION: ICD-10-CM

## 2022-11-03 RX ORDER — BUPRENORPHINE 15 UG/H
1 PATCH TRANSDERMAL WEEKLY
Qty: 4 PATCH | Refills: 0 | Status: SHIPPED | OUTPATIENT
Start: 2022-11-03 | End: 2022-11-11

## 2022-11-03 NOTE — TELEPHONE ENCOUNTER
Rx Refill Note  Requested Prescriptions     Pending Prescriptions Disp Refills   • Buprenorphine (Butrans) 15 MCG/HR patch weekly 4 patch 0     Sig: Place 1 patch on the skin as directed by provider 1 (One) Time Per Week.      Last office visit with prescribing clinician: 8/12/2022      Next office visit with prescribing clinician: 11/11/2022            Debi Camacho MA  11/03/22, 10:03 EDT

## 2022-11-11 ENCOUNTER — OFFICE VISIT (OUTPATIENT)
Dept: PAIN MEDICINE | Facility: CLINIC | Age: 57
End: 2022-11-11

## 2022-11-11 VITALS
OXYGEN SATURATION: 98 % | RESPIRATION RATE: 16 BRPM | SYSTOLIC BLOOD PRESSURE: 144 MMHG | DIASTOLIC BLOOD PRESSURE: 86 MMHG | HEART RATE: 63 BPM

## 2022-11-11 DIAGNOSIS — M54.42 CHRONIC BILATERAL LOW BACK PAIN WITH BILATERAL SCIATICA: ICD-10-CM

## 2022-11-11 DIAGNOSIS — G89.29 CHRONIC BILATERAL LOW BACK PAIN WITH BILATERAL SCIATICA: ICD-10-CM

## 2022-11-11 DIAGNOSIS — M54.41 CHRONIC BILATERAL LOW BACK PAIN WITH BILATERAL SCIATICA: ICD-10-CM

## 2022-11-11 DIAGNOSIS — M48.062 SPINAL STENOSIS OF LUMBAR REGION WITH NEUROGENIC CLAUDICATION: Primary | ICD-10-CM

## 2022-11-11 PROCEDURE — G0463 HOSPITAL OUTPT CLINIC VISIT: HCPCS | Performed by: STUDENT IN AN ORGANIZED HEALTH CARE EDUCATION/TRAINING PROGRAM

## 2022-11-11 PROCEDURE — 99214 OFFICE O/P EST MOD 30 MIN: CPT | Performed by: STUDENT IN AN ORGANIZED HEALTH CARE EDUCATION/TRAINING PROGRAM

## 2022-11-11 RX ORDER — BUPRENORPHINE 20 UG/H
1 PATCH TRANSDERMAL WEEKLY
Qty: 1 PATCH | Refills: 0 | Status: SHIPPED | OUTPATIENT
Start: 2023-01-04 | End: 2022-11-11

## 2022-11-11 RX ORDER — BUPRENORPHINE 20 UG/H
1 PATCH TRANSDERMAL WEEKLY
Qty: 1 PATCH | Refills: 0 | Status: SHIPPED | OUTPATIENT
Start: 2022-12-07 | End: 2022-11-11

## 2022-11-11 RX ORDER — BUPRENORPHINE 20 UG/H
1 PATCH TRANSDERMAL WEEKLY
Qty: 4 PATCH | Refills: 0 | Status: SHIPPED | OUTPATIENT
Start: 2023-01-04 | End: 2023-02-03 | Stop reason: SDUPTHER

## 2022-11-11 RX ORDER — BUPRENORPHINE 20 UG/H
1 PATCH TRANSDERMAL WEEKLY
Qty: 4 PATCH | Refills: 0 | Status: SHIPPED | OUTPATIENT
Start: 2022-12-07 | End: 2023-01-30 | Stop reason: SDUPTHER

## 2022-11-11 NOTE — PROGRESS NOTES
CHIEF COMPLAINT  Chief Complaint   Patient presents with   • Back Pain     3 month f/u for spinal stenosis of lumbar region treated w/Butrans patch        Primary Care  Deni Mahoney, LOAN Rollins V Tristian is a 57 y.o. male  who presents for chronic low back pain and bilateral lumbar radiculopathy.  He states that he is on the pain ongoing for many years following a work injury.  He states that since that time, he has been followed by an outside pain clinic who has started him on multiple medications including Percocet and most recently, MS Contin.  He states that while he does get some relief with his current pain regimen, he feels like he has less benefit than when he was getting fentanyl patches.  He states that he was told by his previous pain provider that he could no longer get the fentanyl due to supply issues.  He presents today for establishment of care and further work-up management.  He denies any red flag symptoms, but does admit to generalized weakness in the lower extremities bilaterally.    Back Pain  Associated symptoms include weakness. Pertinent negatives include no numbness.        Location: Low back and bilateral lower extremities  Onset: Many years ago  Duration: Progressively worsening  Timing: Constant throughout the day  Quality: Sharp, stabbing pains with associated weakness  Severity: Today: 6       Last Week: 6       Worst: 10  Modifying Factors: The pain is worse with any physical activity and movement and improves with rest    Physical Therapy: no    Interval Update 11/11/2022: Complaining of less benefit today with his Butrans patch.  Requesting higher dose to 20 mcg/h.  Otherwise, no changes.    The following portions of the patient's history were reviewed and updated as appropriate: allergies, current medications, past family history, past medical history, past social history, past surgical history and problem list.      Current Outpatient Medications:   •  aspirin  325 MG tablet, Take 325 mg by mouth Daily., Disp: , Rfl:   •  [START ON 1/4/2023] Buprenorphine (Butrans) 20 MCG/HR patch weekly, Place 20 mcg on the skin as directed by provider 1 (One) Time Per Week., Disp: 4 patch, Rfl: 0  •  [START ON 12/7/2022] Buprenorphine (Butrans) 20 MCG/HR patch weekly, Place 20 mcg on the skin as directed by provider 1 (One) Time Per Week., Disp: 4 patch, Rfl: 0  •  lisinopril (PRINIVIL,ZESTRIL) 10 MG tablet, , Disp: , Rfl:     Review of Systems   Gastrointestinal: Negative for constipation.   Musculoskeletal: Positive for back pain and gait problem.   Neurological: Positive for weakness. Negative for numbness.       Vitals:    11/11/22 1409   BP: 144/86   Pulse: 63   Resp: 16   SpO2: 98%   PainSc:   8       Urine Drug Screen: 12/03/2021  Appropriate: Yes    Objective   Physical Exam  Vitals and nursing note reviewed. Exam conducted with a chaperone present.   Constitutional:       General: He is not in acute distress.     Appearance: Normal appearance. He is well-developed and normal weight.   Neck:      Trachea: No tracheal deviation.   Musculoskeletal:      Comments: Lumbar Spine Exam:  Tender to palpation over the lumbar paraspinal musculature No  Limited range of motion secondary to pain No  Facet loading positive: Equivocal  Facets tender to palpation: Equivocal  Straight leg raise test positive: Equivocal       Neurological:      General: No focal deficit present.      Mental Status: He is alert.      Sensory: No sensory deficit.      Motor: Weakness present.      Comments: Generalized global weakness           Assessment & Plan   Problems Addressed this Visit    None  Visit Diagnoses     Spinal stenosis of lumbar region with neurogenic claudication    -  Primary    Relevant Medications    Buprenorphine (Butrans) 20 MCG/HR patch weekly (Start on 1/4/2023)    Buprenorphine (Butrans) 20 MCG/HR patch weekly (Start on 12/7/2022)    Chronic bilateral low back pain with bilateral  sciatica          Diagnoses       Codes Comments    Spinal stenosis of lumbar region with neurogenic claudication    -  Primary ICD-10-CM: M48.062  ICD-9-CM: 724.03     Chronic bilateral low back pain with bilateral sciatica     ICD-10-CM: M54.42, M54.41, G89.29  ICD-9-CM: 724.2, 724.3, 338.29           Plan:  1. His lumbar plain films are fairly unremarkable  2. MRI review shows lower lumbar central and foraminal stenosis but is otherwise fairly unremarkable  3. Increase Butrans to 20 mcg/h  4. Follow-up 3 months  --- Follow-up 3 months           INSPECT REPORT    As part of the patient's treatment plan, I may be prescribing controlled substances. The patient has been made aware of appropriate use of such medications, including potential risk of somnolence, limited ability to drive and/or work safely, and the potential for dependence or overdose. It has also bee made clear that these medications are for use by this patient only, without concomitant use of alcohol or other substances unless prescribed.     Patient has completed prescribing agreement detailing terms of continued prescribing of controlled substances, including monitoring HANNY reports, urine drug screening, and pill counts if necessary. The patient is aware that inappropriate use will results in cessation of prescribing such medications.    INSPECT report has been reviewed and scanned into the patient's chart.    As the clinician, I personally reviewed the INSPECT from 11/9/2022.    History and physical exam exhibit continued safe and appropriate use of controlled substances.      EMR Dragon/Transcription disclaimer:   Much of this encounter note is an electronic transcription/translation of spoken language to printed text. The electronic translation of spoken language may permit erroneous, or at times, nonsensical words or phrases to be inadvertently transcribed; Although I have reviewed the note for such errors, some may still exist.

## 2023-01-30 DIAGNOSIS — M48.062 SPINAL STENOSIS OF LUMBAR REGION WITH NEUROGENIC CLAUDICATION: ICD-10-CM

## 2023-01-30 RX ORDER — BUPRENORPHINE 20 UG/H
1 PATCH TRANSDERMAL WEEKLY
Qty: 4 PATCH | Refills: 0 | Status: SHIPPED | OUTPATIENT
Start: 2023-01-30 | End: 2023-02-03 | Stop reason: SDUPTHER

## 2023-02-03 ENCOUNTER — OFFICE VISIT (OUTPATIENT)
Dept: PAIN MEDICINE | Facility: CLINIC | Age: 58
End: 2023-02-03
Payer: MEDICAID

## 2023-02-03 VITALS
DIASTOLIC BLOOD PRESSURE: 83 MMHG | SYSTOLIC BLOOD PRESSURE: 175 MMHG | RESPIRATION RATE: 16 BRPM | OXYGEN SATURATION: 98 % | HEART RATE: 64 BPM

## 2023-02-03 DIAGNOSIS — M48.062 SPINAL STENOSIS OF LUMBAR REGION WITH NEUROGENIC CLAUDICATION: ICD-10-CM

## 2023-02-03 DIAGNOSIS — G89.29 CHRONIC BILATERAL LOW BACK PAIN WITH BILATERAL SCIATICA: Primary | ICD-10-CM

## 2023-02-03 DIAGNOSIS — M54.41 CHRONIC BILATERAL LOW BACK PAIN WITH BILATERAL SCIATICA: Primary | ICD-10-CM

## 2023-02-03 DIAGNOSIS — M54.42 CHRONIC BILATERAL LOW BACK PAIN WITH BILATERAL SCIATICA: Primary | ICD-10-CM

## 2023-02-03 PROCEDURE — G0463 HOSPITAL OUTPT CLINIC VISIT: HCPCS | Performed by: STUDENT IN AN ORGANIZED HEALTH CARE EDUCATION/TRAINING PROGRAM

## 2023-02-03 PROCEDURE — 99214 OFFICE O/P EST MOD 30 MIN: CPT | Performed by: STUDENT IN AN ORGANIZED HEALTH CARE EDUCATION/TRAINING PROGRAM

## 2023-02-03 RX ORDER — BUPRENORPHINE 20 UG/H
1 PATCH TRANSDERMAL WEEKLY
Qty: 4 EACH | Refills: 0 | Status: SHIPPED | OUTPATIENT
Start: 2023-04-26

## 2023-02-03 RX ORDER — BUPRENORPHINE 20 UG/H
1 PATCH TRANSDERMAL WEEKLY
Qty: 4 PATCH | Refills: 0 | Status: SHIPPED | OUTPATIENT
Start: 2023-03-01

## 2023-02-03 RX ORDER — BUPRENORPHINE 20 UG/H
1 PATCH TRANSDERMAL WEEKLY
Qty: 4 PATCH | Refills: 0 | Status: SHIPPED | OUTPATIENT
Start: 2023-03-29

## 2023-02-03 NOTE — PROGRESS NOTES
CHIEF COMPLAINT  Chief Complaint   Patient presents with   • Back Pain     3 month f/u  CC  Spinal stenosis of lumbar region treated w/Butran patch        Primary Care  Deni Mahoney APRN Subjective Raymond V Tristian is a 57 y.o. male  who presents for chronic low back pain and bilateral lumbar radiculopathy.  He states that he is on the pain ongoing for many years following a work injury.  He states that since that time, he has been followed by an outside pain clinic who has started him on multiple medications including Percocet and most recently, MS Contin.  He states that while he does get some relief with his current pain regimen, he feels like he has less benefit than when he was getting fentanyl patches.  He states that he was told by his previous pain provider that he could no longer get the fentanyl due to supply issues.  He presents today for establishment of care and further work-up management.  He denies any red flag symptoms, but does admit to generalized weakness in the lower extremities bilaterally.    Back Pain  Associated symptoms include weakness. Pertinent negatives include no numbness.        Location: Low back and bilateral lower extremities  Onset: Many years ago  Duration: Progressively worsening  Timing: Constant throughout the day  Quality: Sharp, stabbing pains with associated weakness  Severity: Today: 6       Last Week: 6       Worst: 10  Modifying Factors: The pain is worse with any physical activity and movement and improves with rest    Physical Therapy: no    Interval Update 02/03/2023: He reports doing better with the Butrans 20 mcg/h.  Otherwise, no changes.    The following portions of the patient's history were reviewed and updated as appropriate: allergies, current medications, past family history, past medical history, past social history, past surgical history and problem list.      Current Outpatient Medications:   •  aspirin 325 MG tablet, Take 325 mg by mouth Daily.,  Disp: , Rfl:   •  [START ON 3/29/2023] Buprenorphine (Butrans) 20 MCG/HR patch weekly, Place 20 mcg on the skin as directed by provider 1 (One) Time Per Week., Disp: 4 patch, Rfl: 0  •  [START ON 3/1/2023] Buprenorphine (Butrans) 20 MCG/HR patch weekly, Place 20 mcg on the skin as directed by provider 1 (One) Time Per Week., Disp: 4 patch, Rfl: 0  •  lisinopril (PRINIVIL,ZESTRIL) 10 MG tablet, , Disp: , Rfl:   •  [START ON 4/26/2023] Buprenorphine (Butrans) 20 MCG/HR patch weekly, Place 20 mcg on the skin as directed by provider 1 (One) Time Per Week., Disp: 4 each, Rfl: 0    Review of Systems   Gastrointestinal: Negative for constipation.   Musculoskeletal: Positive for back pain and gait problem.   Neurological: Positive for weakness. Negative for numbness.       Vitals:    02/03/23 1321   BP: 175/83   Pulse: 64   Resp: 16   SpO2: 98%   PainSc:   7       Urine Drug Screen: 12/03/2021  Appropriate: Yes    Objective   Physical Exam  Vitals and nursing note reviewed. Exam conducted with a chaperone present.   Constitutional:       General: He is not in acute distress.     Appearance: Normal appearance. He is well-developed and normal weight.   Neck:      Trachea: No tracheal deviation.   Musculoskeletal:      Comments: Lumbar Spine Exam:  Tender to palpation over the lumbar paraspinal musculature No  Limited range of motion secondary to pain No  Facet loading positive: Equivocal  Facets tender to palpation: Equivocal  Straight leg raise test positive: Equivocal       Neurological:      General: No focal deficit present.      Mental Status: He is alert.      Sensory: No sensory deficit.      Motor: Weakness present.      Comments: Generalized global weakness           Assessment & Plan   Problems Addressed this Visit    None  Visit Diagnoses     Chronic bilateral low back pain with bilateral sciatica    -  Primary    Spinal stenosis of lumbar region with neurogenic claudication        Relevant Medications     Buprenorphine (Butrans) 20 MCG/HR patch weekly (Start on 3/29/2023)    Buprenorphine (Butrans) 20 MCG/HR patch weekly (Start on 3/1/2023)    Buprenorphine (Butrans) 20 MCG/HR patch weekly (Start on 4/26/2023)      Diagnoses       Codes Comments    Chronic bilateral low back pain with bilateral sciatica    -  Primary ICD-10-CM: M54.42, M54.41, G89.29  ICD-9-CM: 724.2, 724.3, 338.29     Spinal stenosis of lumbar region with neurogenic claudication     ICD-10-CM: M48.062  ICD-9-CM: 724.03           Plan:  1. His lumbar plain films are fairly unremarkable  2. MRI review shows lower lumbar central and foraminal stenosis but is otherwise fairly unremarkable  3. Increase Butrans to 20 mcg/h  4. Follow-up 3 months  5. Inspect appropriate  --- Follow-up 3 months           INSPECT REPORT    As part of the patient's treatment plan, I may be prescribing controlled substances. The patient has been made aware of appropriate use of such medications, including potential risk of somnolence, limited ability to drive and/or work safely, and the potential for dependence or overdose. It has also bee made clear that these medications are for use by this patient only, without concomitant use of alcohol or other substances unless prescribed.     Patient has completed prescribing agreement detailing terms of continued prescribing of controlled substances, including monitoring HANNY reports, urine drug screening, and pill counts if necessary. The patient is aware that inappropriate use will results in cessation of prescribing such medications.    INSPECT report has been reviewed and scanned into the patient's chart.    As the clinician, I personally reviewed the INSPECT from 1/21/2023.    History and physical exam exhibit continued safe and appropriate use of controlled substances.      EMR Dragon/Transcription disclaimer:   Much of this encounter note is an electronic transcription/translation of spoken language to printed text. The  electronic translation of spoken language may permit erroneous, or at times, nonsensical words or phrases to be inadvertently transcribed; Although I have reviewed the note for such errors, some may still exist.

## 2023-05-19 ENCOUNTER — OFFICE VISIT (OUTPATIENT)
Dept: PAIN MEDICINE | Facility: CLINIC | Age: 58
End: 2023-05-19
Payer: MEDICAID

## 2023-05-19 VITALS
HEART RATE: 69 BPM | OXYGEN SATURATION: 97 % | SYSTOLIC BLOOD PRESSURE: 147 MMHG | DIASTOLIC BLOOD PRESSURE: 91 MMHG | RESPIRATION RATE: 16 BRPM

## 2023-05-19 DIAGNOSIS — M54.41 CHRONIC BILATERAL LOW BACK PAIN WITH BILATERAL SCIATICA: ICD-10-CM

## 2023-05-19 DIAGNOSIS — G89.29 CHRONIC BILATERAL LOW BACK PAIN WITH BILATERAL SCIATICA: ICD-10-CM

## 2023-05-19 DIAGNOSIS — M54.42 CHRONIC BILATERAL LOW BACK PAIN WITH BILATERAL SCIATICA: ICD-10-CM

## 2023-05-19 DIAGNOSIS — M48.062 SPINAL STENOSIS OF LUMBAR REGION WITH NEUROGENIC CLAUDICATION: Primary | ICD-10-CM

## 2023-05-19 PROCEDURE — G0463 HOSPITAL OUTPT CLINIC VISIT: HCPCS | Performed by: STUDENT IN AN ORGANIZED HEALTH CARE EDUCATION/TRAINING PROGRAM

## 2023-05-19 PROCEDURE — 1159F MED LIST DOCD IN RCRD: CPT | Performed by: STUDENT IN AN ORGANIZED HEALTH CARE EDUCATION/TRAINING PROGRAM

## 2023-05-19 PROCEDURE — 1160F RVW MEDS BY RX/DR IN RCRD: CPT | Performed by: STUDENT IN AN ORGANIZED HEALTH CARE EDUCATION/TRAINING PROGRAM

## 2023-05-19 PROCEDURE — 1125F AMNT PAIN NOTED PAIN PRSNT: CPT | Performed by: STUDENT IN AN ORGANIZED HEALTH CARE EDUCATION/TRAINING PROGRAM

## 2023-05-19 PROCEDURE — 99214 OFFICE O/P EST MOD 30 MIN: CPT | Performed by: STUDENT IN AN ORGANIZED HEALTH CARE EDUCATION/TRAINING PROGRAM

## 2023-05-19 RX ORDER — BUPRENORPHINE 15 UG/H
1 PATCH TRANSDERMAL WEEKLY
Qty: 4 PATCH | Refills: 0 | Status: SHIPPED | OUTPATIENT
Start: 2023-06-21

## 2023-05-19 RX ORDER — BUPRENORPHINE 15 UG/H
1 PATCH TRANSDERMAL WEEKLY
Qty: 4 PATCH | Refills: 0 | Status: SHIPPED | OUTPATIENT
Start: 2023-05-24

## 2023-05-19 RX ORDER — BUPRENORPHINE 15 UG/H
1 PATCH TRANSDERMAL WEEKLY
Qty: 4 PATCH | Refills: 0 | Status: SHIPPED | OUTPATIENT
Start: 2023-07-19

## 2023-05-19 NOTE — PROGRESS NOTES
CHIEF COMPLAINT  Chief Complaint   Patient presents with   • Back Pain     Butrans patch in place       Primary Care  Deni Mahoney, LOAN Rollins V Tristian is a 57 y.o. male  who presents for chronic low back pain and bilateral lumbar radiculopathy.  He states that he is on the pain ongoing for many years following a work injury.  He states that since that time, he has been followed by an outside pain clinic who has started him on multiple medications including Percocet and most recently, MS Contin.  He states that while he does get some relief with his current pain regimen, he feels like he has less benefit than when he was getting fentanyl patches.  He states that he was told by his previous pain provider that he could no longer get the fentanyl due to supply issues.  He presents today for establishment of care and further work-up management.  He denies any red flag symptoms, but does admit to generalized weakness in the lower extremities bilaterally.    Back Pain  Associated symptoms include weakness. Pertinent negatives include no numbness.        Location: Low back and bilateral lower extremities  Onset: Many years ago  Duration: Progressively worsening  Timing: Constant throughout the day  Quality: Sharp, stabbing pains with associated weakness  Severity: Today: 6       Last Week: 6       Worst: 10  Modifying Factors: The pain is worse with any physical activity and movement and improves with rest    Physical Therapy: no    Interval Update 05/19/2023: He Lorne feels that the Butrans 20 mcg patches to strong for him.  We will decrease back to 15 mcg    The following portions of the patient's history were reviewed and updated as appropriate: allergies, current medications, past family history, past medical history, past social history, past surgical history and problem list.      Current Outpatient Medications:   •  aspirin 325 MG tablet, Take 1 tablet by mouth Daily., Disp: , Rfl:   •   lisinopril (PRINIVIL,ZESTRIL) 10 MG tablet, , Disp: , Rfl:   •  [START ON 5/24/2023] Buprenorphine (Butrans) 15 MCG/HR patch weekly, Place 1 patch on the skin as directed by provider 1 (One) Time Per Week., Disp: 4 patch, Rfl: 0  •  [START ON 6/21/2023] Buprenorphine (Butrans) 15 MCG/HR patch weekly, Place 1 patch on the skin as directed by provider 1 (One) Time Per Week., Disp: 4 patch, Rfl: 0  •  [START ON 7/19/2023] Buprenorphine (Butrans) 15 MCG/HR patch weekly, Place 1 patch on the skin as directed by provider 1 (One) Time Per Week., Disp: 4 patch, Rfl: 0    Review of Systems   Gastrointestinal: Negative for constipation.   Musculoskeletal: Positive for back pain and gait problem.   Neurological: Positive for weakness. Negative for numbness.       Vitals:    05/19/23 1336   BP: 147/91   Pulse: 69   Resp: 16   SpO2: 97%   PainSc:   6       Urine Drug Screen: 12/03/2021  Appropriate: Yes    Objective   Physical Exam  Vitals and nursing note reviewed. Exam conducted with a chaperone present.   Constitutional:       General: He is not in acute distress.     Appearance: Normal appearance. He is well-developed and normal weight.   Neck:      Trachea: No tracheal deviation.   Musculoskeletal:      Comments: Lumbar Spine Exam:  Tender to palpation over the lumbar paraspinal musculature No  Limited range of motion secondary to pain No  Facet loading positive: Equivocal  Facets tender to palpation: Equivocal  Straight leg raise test positive: Equivocal       Neurological:      General: No focal deficit present.      Mental Status: He is alert.      Sensory: No sensory deficit.      Motor: Weakness present.      Comments: Generalized global weakness           Assessment & Plan   Problems Addressed this Visit    None  Visit Diagnoses     Spinal stenosis of lumbar region with neurogenic claudication    -  Primary    Relevant Medications    Buprenorphine (Butrans) 15 MCG/HR patch weekly (Start on 5/24/2023)    Buprenorphine  (Butrans) 15 MCG/HR patch weekly (Start on 6/21/2023)    Buprenorphine (Butrans) 15 MCG/HR patch weekly (Start on 7/19/2023)    Chronic bilateral low back pain with bilateral sciatica        Relevant Medications    Buprenorphine (Butrans) 15 MCG/HR patch weekly (Start on 5/24/2023)    Buprenorphine (Butrans) 15 MCG/HR patch weekly (Start on 6/21/2023)    Buprenorphine (Butrans) 15 MCG/HR patch weekly (Start on 7/19/2023)      Diagnoses       Codes Comments    Spinal stenosis of lumbar region with neurogenic claudication    -  Primary ICD-10-CM: M48.062  ICD-9-CM: 724.03     Chronic bilateral low back pain with bilateral sciatica     ICD-10-CM: M54.42, M54.41, G89.29  ICD-9-CM: 724.2, 724.3, 338.29           Plan:  1. His lumbar plain films are fairly unremarkable  2. MRI review shows lower lumbar central and foraminal stenosis but is otherwise fairly unremarkable  3. Switch back to 50 mcg Butrans  4. Follow-up 3 months  5. Inspect appropriate  --- Follow-up 3 months           INSPECT REPORT    As part of the patient's treatment plan, I may be prescribing controlled substances. The patient has been made aware of appropriate use of such medications, including potential risk of somnolence, limited ability to drive and/or work safely, and the potential for dependence or overdose. It has also bee made clear that these medications are for use by this patient only, without concomitant use of alcohol or other substances unless prescribed.     Patient has completed prescribing agreement detailing terms of continued prescribing of controlled substances, including monitoring HANNY reports, urine drug screening, and pill counts if necessary. The patient is aware that inappropriate use will results in cessation of prescribing such medications.    INSPECT report has been reviewed and scanned into the patient's chart.    As the clinician, I personally reviewed the INSPECT from 5/17/2023.    History and physical exam exhibit  continued safe and appropriate use of controlled substances.      EMR Dragon/Transcription disclaimer:   Much of this encounter note is an electronic transcription/translation of spoken language to printed text. The electronic translation of spoken language may permit erroneous, or at times, nonsensical words or phrases to be inadvertently transcribed; Although I have reviewed the note for such errors, some may still exist.

## 2023-08-11 ENCOUNTER — OFFICE VISIT (OUTPATIENT)
Dept: PAIN MEDICINE | Facility: CLINIC | Age: 58
End: 2023-08-11
Payer: MEDICAID

## 2023-08-11 VITALS
DIASTOLIC BLOOD PRESSURE: 80 MMHG | OXYGEN SATURATION: 98 % | HEART RATE: 68 BPM | RESPIRATION RATE: 16 BRPM | SYSTOLIC BLOOD PRESSURE: 133 MMHG

## 2023-08-11 DIAGNOSIS — M54.41 CHRONIC BILATERAL LOW BACK PAIN WITH BILATERAL SCIATICA: ICD-10-CM

## 2023-08-11 DIAGNOSIS — G89.29 CHRONIC BILATERAL LOW BACK PAIN WITH BILATERAL SCIATICA: ICD-10-CM

## 2023-08-11 DIAGNOSIS — M48.062 SPINAL STENOSIS OF LUMBAR REGION WITH NEUROGENIC CLAUDICATION: ICD-10-CM

## 2023-08-11 DIAGNOSIS — M54.42 CHRONIC BILATERAL LOW BACK PAIN WITH BILATERAL SCIATICA: ICD-10-CM

## 2023-08-11 PROCEDURE — 99214 OFFICE O/P EST MOD 30 MIN: CPT | Performed by: STUDENT IN AN ORGANIZED HEALTH CARE EDUCATION/TRAINING PROGRAM

## 2023-08-11 PROCEDURE — 1160F RVW MEDS BY RX/DR IN RCRD: CPT | Performed by: STUDENT IN AN ORGANIZED HEALTH CARE EDUCATION/TRAINING PROGRAM

## 2023-08-11 PROCEDURE — G0463 HOSPITAL OUTPT CLINIC VISIT: HCPCS | Performed by: STUDENT IN AN ORGANIZED HEALTH CARE EDUCATION/TRAINING PROGRAM

## 2023-08-11 PROCEDURE — 1125F AMNT PAIN NOTED PAIN PRSNT: CPT | Performed by: STUDENT IN AN ORGANIZED HEALTH CARE EDUCATION/TRAINING PROGRAM

## 2023-08-11 PROCEDURE — 1159F MED LIST DOCD IN RCRD: CPT | Performed by: STUDENT IN AN ORGANIZED HEALTH CARE EDUCATION/TRAINING PROGRAM

## 2023-08-11 RX ORDER — BUPRENORPHINE 15 UG/H
1 PATCH TRANSDERMAL WEEKLY
Qty: 4 PATCH | Refills: 0 | Status: SHIPPED | OUTPATIENT
Start: 2023-08-14

## 2023-08-11 RX ORDER — BUPRENORPHINE 15 UG/H
1 PATCH TRANSDERMAL WEEKLY
Qty: 4 PATCH | Refills: 0 | Status: SHIPPED | OUTPATIENT
Start: 2023-09-11

## 2023-08-11 RX ORDER — BUPRENORPHINE 15 UG/H
1 PATCH TRANSDERMAL WEEKLY
Qty: 4 PATCH | Refills: 0 | Status: SHIPPED | OUTPATIENT
Start: 2023-10-09

## 2023-08-11 NOTE — PROGRESS NOTES
CHIEF COMPLAINT  Chief Complaint   Patient presents with    Back Pain     LD Butrans Patch @ 8/7/23       Primary Care  Deni Mahoney, LOAN Rollins V Tristian is a 58 y.o. male  who presents for chronic low back pain and bilateral lumbar radiculopathy.  He states that he is on the pain ongoing for many years following a work injury.  He states that since that time, he has been followed by an outside pain clinic who has started him on multiple medications including Percocet and most recently, MS Contin.  He states that while he does get some relief with his current pain regimen, he feels like he has less benefit than when he was getting fentanyl patches.  He states that he was told by his previous pain provider that he could no longer get the fentanyl due to supply issues.  He presents today for establishment of care and further work-up management.  He denies any red flag symptoms, but does admit to generalized weakness in the lower extremities bilaterally.    Back Pain  Associated symptoms include weakness. Pertinent negatives include no numbness.      Location: Low back and bilateral lower extremities  Onset: Many years ago  Duration: Progressively worsening  Timing: Constant throughout the day  Quality: Sharp, stabbing pains with associated weakness  Severity: Today: 6       Last Week: 6       Worst: 10  Modifying Factors: The pain is worse with any physical activity and movement and improves with rest    Physical Therapy: no    Interval Update 08/11/2023: Overall, doing okay with Butrans 15 mcg patch.  He has had some issue obtaining the medication.    The following portions of the patient's history were reviewed and updated as appropriate: allergies, current medications, past family history, past medical history, past social history, past surgical history and problem list.      Current Outpatient Medications:     aspirin 325 MG tablet, Take 1 tablet by mouth Daily., Disp: , Rfl:     [START ON  10/9/2023] Buprenorphine (Butrans) 15 MCG/HR patch weekly, Place 1 patch on the skin as directed by provider 1 (One) Time Per Week., Disp: 4 patch, Rfl: 0    [START ON 9/11/2023] Buprenorphine (Butrans) 15 MCG/HR patch weekly, Place 1 patch on the skin as directed by provider 1 (One) Time Per Week., Disp: 4 patch, Rfl: 0    [START ON 8/14/2023] Buprenorphine (Butrans) 15 MCG/HR patch weekly, Place 1 patch on the skin as directed by provider 1 (One) Time Per Week., Disp: 4 patch, Rfl: 0    lisinopril (PRINIVIL,ZESTRIL) 10 MG tablet, , Disp: , Rfl:     lisinopril (PRINIVIL,ZESTRIL) 10 MG tablet, Take 1 tablet by mouth Daily., Disp: 30 tablet, Rfl: 3    Review of Systems   Gastrointestinal:  Negative for constipation.   Musculoskeletal:  Positive for back pain and gait problem.   Neurological:  Positive for weakness. Negative for numbness.     Vitals:    08/11/23 1322   BP: 133/80   Pulse: 68   Resp: 16   SpO2: 98%   PainSc:   8       Urine Drug Screen: 12/03/2021  Appropriate: Yes    Objective   Physical Exam  Vitals and nursing note reviewed. Exam conducted with a chaperone present.   Constitutional:       General: He is not in acute distress.     Appearance: Normal appearance. He is well-developed and normal weight.   Neck:      Trachea: No tracheal deviation.   Musculoskeletal:      Comments: Lumbar Spine Exam:  Tender to palpation over the lumbar paraspinal musculature No  Limited range of motion secondary to pain No  Facet loading positive: Equivocal  Facets tender to palpation: Equivocal  Straight leg raise test positive: Equivocal       Neurological:      General: No focal deficit present.      Mental Status: He is alert.      Sensory: No sensory deficit.      Motor: Weakness present.      Comments: Generalized global weakness         Assessment & Plan   Problems Addressed this Visit    None  Visit Diagnoses       Spinal stenosis of lumbar region with neurogenic claudication        Relevant Medications     Buprenorphine (Butrans) 15 MCG/HR patch weekly (Start on 10/9/2023)    Buprenorphine (Butrans) 15 MCG/HR patch weekly (Start on 9/11/2023)    Buprenorphine (Butrans) 15 MCG/HR patch weekly (Start on 8/14/2023)    Chronic bilateral low back pain with bilateral sciatica        Relevant Medications    Buprenorphine (Butrans) 15 MCG/HR patch weekly (Start on 10/9/2023)    Buprenorphine (Butrans) 15 MCG/HR patch weekly (Start on 9/11/2023)    Buprenorphine (Butrans) 15 MCG/HR patch weekly (Start on 8/14/2023)          Diagnoses         Codes Comments    Spinal stenosis of lumbar region with neurogenic claudication     ICD-10-CM: M48.062  ICD-9-CM: 724.03     Chronic bilateral low back pain with bilateral sciatica     ICD-10-CM: M54.42, M54.41, G89.29  ICD-9-CM: 724.2, 724.3, 338.29             Plan:  His lumbar plain films are fairly unremarkable  MRI review shows lower lumbar central and foraminal stenosis but is otherwise fairly unremarkable  Continue Butrans 15 mcg/h patch  Follow-up 3 months  Inspect appropriate  --- Follow-up 3 months           INSPECT REPORT    As part of the patient's treatment plan, I may be prescribing controlled substances. The patient has been made aware of appropriate use of such medications, including potential risk of somnolence, limited ability to drive and/or work safely, and the potential for dependence or overdose. It has also bee made clear that these medications are for use by this patient only, without concomitant use of alcohol or other substances unless prescribed.     Patient has completed prescribing agreement detailing terms of continued prescribing of controlled substances, including monitoring HANNY reports, urine drug screening, and pill counts if necessary. The patient is aware that inappropriate use will results in cessation of prescribing such medications.    INSPECT report has been reviewed and scanned into the patient's chart.    As the clinician, I personally reviewed  the INSPECT from 8/8/2023.    History and physical exam exhibit continued safe and appropriate use of controlled substances.      EMR Dragon/Transcription disclaimer:   Much of this encounter note is an electronic transcription/translation of spoken language to printed text. The electronic translation of spoken language may permit erroneous, or at times, nonsensical words or phrases to be inadvertently transcribed; Although I have reviewed the note for such errors, some may still exist.

## 2023-11-03 ENCOUNTER — OFFICE VISIT (OUTPATIENT)
Dept: PAIN MEDICINE | Facility: CLINIC | Age: 58
End: 2023-11-03
Payer: MEDICAID

## 2023-11-03 VITALS
SYSTOLIC BLOOD PRESSURE: 144 MMHG | RESPIRATION RATE: 16 BRPM | HEART RATE: 69 BPM | DIASTOLIC BLOOD PRESSURE: 78 MMHG | OXYGEN SATURATION: 98 %

## 2023-11-03 DIAGNOSIS — M54.41 CHRONIC BILATERAL LOW BACK PAIN WITH BILATERAL SCIATICA: ICD-10-CM

## 2023-11-03 DIAGNOSIS — Z79.899 HIGH RISK MEDICATION USE: Primary | ICD-10-CM

## 2023-11-03 DIAGNOSIS — G89.29 CHRONIC BILATERAL LOW BACK PAIN WITH BILATERAL SCIATICA: ICD-10-CM

## 2023-11-03 DIAGNOSIS — M54.42 CHRONIC BILATERAL LOW BACK PAIN WITH BILATERAL SCIATICA: ICD-10-CM

## 2023-11-03 DIAGNOSIS — M48.062 SPINAL STENOSIS OF LUMBAR REGION WITH NEUROGENIC CLAUDICATION: ICD-10-CM

## 2023-11-03 PROCEDURE — 1159F MED LIST DOCD IN RCRD: CPT | Performed by: STUDENT IN AN ORGANIZED HEALTH CARE EDUCATION/TRAINING PROGRAM

## 2023-11-03 PROCEDURE — 1125F AMNT PAIN NOTED PAIN PRSNT: CPT | Performed by: STUDENT IN AN ORGANIZED HEALTH CARE EDUCATION/TRAINING PROGRAM

## 2023-11-03 PROCEDURE — G0463 HOSPITAL OUTPT CLINIC VISIT: HCPCS | Performed by: STUDENT IN AN ORGANIZED HEALTH CARE EDUCATION/TRAINING PROGRAM

## 2023-11-03 PROCEDURE — 1160F RVW MEDS BY RX/DR IN RCRD: CPT | Performed by: STUDENT IN AN ORGANIZED HEALTH CARE EDUCATION/TRAINING PROGRAM

## 2023-11-03 RX ORDER — BUPRENORPHINE 15 UG/H
1 PATCH TRANSDERMAL WEEKLY
Qty: 4 PATCH | Refills: 0 | Status: SHIPPED | OUTPATIENT
Start: 2023-11-13

## 2023-11-03 RX ORDER — BUPRENORPHINE 15 UG/H
1 PATCH TRANSDERMAL WEEKLY
Qty: 4 PATCH | Refills: 0 | Status: SHIPPED | OUTPATIENT
Start: 2023-12-11

## 2023-11-03 RX ORDER — BUPRENORPHINE 15 UG/H
1 PATCH TRANSDERMAL WEEKLY
Qty: 4 PATCH | Refills: 0 | Status: SHIPPED | OUTPATIENT
Start: 2024-01-08

## 2023-11-03 NOTE — PROGRESS NOTES
CHIEF COMPLAINT  Chief Complaint   Patient presents with    Back Pain     LD Butrans Patch @ 11/1/23       Primary Care  Deni Mahoney APRN Subjective Raymond V Tristian is a 58 y.o. male  who presents for chronic low back pain and bilateral lumbar radiculopathy.  He states that he is on the pain ongoing for many years following a work injury.  He states that since that time, he has been followed by an outside pain clinic who has started him on multiple medications including Percocet and most recently, MS Contin.  He states that while he does get some relief with his current pain regimen, he feels like he has less benefit than when he was getting fentanyl patches.  He states that he was told by his previous pain provider that he could no longer get the fentanyl due to supply issues.  He presents today for establishment of care and further work-up management.  He denies any red flag symptoms, but does admit to generalized weakness in the lower extremities bilaterally.    Back Pain  Associated symptoms include weakness. Pertinent negatives include no numbness.        Location: Low back and bilateral lower extremities  Onset: Many years ago  Duration: Progressively worsening  Timing: Constant throughout the day  Quality: Sharp, stabbing pains with associated weakness  Severity: Today: 6       Last Week: 6       Worst: 10  Modifying Factors: The pain is worse with any physical activity and movement and improves with rest    Physical Therapy: no    Interval Update 11/03/2023: Essentially unchanged.  Feels like he gets about 12 hours of limited benefit with his Butrans patch.  Otherwise, no significant changes.  Does get some pain relief.    The following portions of the patient's history were reviewed and updated as appropriate: allergies, current medications, past family history, past medical history, past social history, past surgical history and problem list.      Current Outpatient Medications:     aspirin 325  MG tablet, Take 1 tablet by mouth Daily., Disp: , Rfl:     [START ON 1/8/2024] Buprenorphine (Butrans) 15 MCG/HR patch weekly, Place 1 patch on the skin as directed by provider 1 (One) Time Per Week., Disp: 4 patch, Rfl: 0    [START ON 12/11/2023] Buprenorphine (Butrans) 15 MCG/HR patch weekly, Place 1 patch on the skin as directed by provider 1 (One) Time Per Week., Disp: 4 patch, Rfl: 0    [START ON 11/13/2023] Buprenorphine (Butrans) 15 MCG/HR patch weekly, Place 1 patch on the skin as directed by provider 1 (One) Time Per Week., Disp: 4 patch, Rfl: 0    lisinopril (PRINIVIL,ZESTRIL) 10 MG tablet, , Disp: , Rfl:     lisinopril (PRINIVIL,ZESTRIL) 10 MG tablet, Take 1 tablet by mouth Daily., Disp: 30 tablet, Rfl: 3    lisinopril (PRINIVIL,ZESTRIL) 10 MG tablet, Take 1 tablet by mouth Daily., Disp: 30 tablet, Rfl: 3    Review of Systems   Gastrointestinal:  Negative for constipation.   Musculoskeletal:  Positive for back pain and gait problem.   Neurological:  Positive for weakness. Negative for numbness.       Vitals:    11/03/23 1309   BP: 144/78   Pulse: 69   Resp: 16   SpO2: 98%   PainSc:   8       Urine Drug Screen: 11/3/2023  Appropriate: Pending    Objective   Physical Exam  Vitals and nursing note reviewed. Exam conducted with a chaperone present.   Constitutional:       General: He is not in acute distress.     Appearance: Normal appearance. He is well-developed and normal weight.   Neck:      Trachea: No tracheal deviation.   Musculoskeletal:      Comments: Lumbar Spine Exam:  Tender to palpation over the lumbar paraspinal musculature No  Limited range of motion secondary to pain No  Facet loading positive: Equivocal  Facets tender to palpation: Equivocal  Straight leg raise test positive: Equivocal       Neurological:      General: No focal deficit present.      Mental Status: He is alert.      Sensory: No sensory deficit.      Motor: Weakness present.      Comments: Generalized global weakness            Assessment & Plan   Problems Addressed this Visit    None  Visit Diagnoses       Spinal stenosis of lumbar region with neurogenic claudication        Relevant Medications    Buprenorphine (Butrans) 15 MCG/HR patch weekly (Start on 1/8/2024)    Buprenorphine (Butrans) 15 MCG/HR patch weekly (Start on 12/11/2023)    Buprenorphine (Butrans) 15 MCG/HR patch weekly (Start on 11/13/2023)    Chronic bilateral low back pain with bilateral sciatica        Relevant Medications    Buprenorphine (Butrans) 15 MCG/HR patch weekly (Start on 1/8/2024)    Buprenorphine (Butrans) 15 MCG/HR patch weekly (Start on 12/11/2023)    Buprenorphine (Butrans) 15 MCG/HR patch weekly (Start on 11/13/2023)          Diagnoses         Codes Comments    Spinal stenosis of lumbar region with neurogenic claudication     ICD-10-CM: M48.062  ICD-9-CM: 724.03     Chronic bilateral low back pain with bilateral sciatica     ICD-10-CM: M54.42, M54.41, G89.29  ICD-9-CM: 724.2, 724.3, 338.29             Plan:  His lumbar plain films are fairly unremarkable  Severe foraminal and central stenosis at L5-S1  Continue Butrans 15 mcg/h patch  Follow-up 3 months  Repeat UDS today per inspect appropriate  --- Follow-up 3 months           INSPECT REPORT    As part of the patient's treatment plan, I may be prescribing controlled substances. The patient has been made aware of appropriate use of such medications, including potential risk of somnolence, limited ability to drive and/or work safely, and the potential for dependence or overdose. It has also bee made clear that these medications are for use by this patient only, without concomitant use of alcohol or other substances unless prescribed.     Patient has completed prescribing agreement detailing terms of continued prescribing of controlled substances, including monitoring HANNY reports, urine drug screening, and pill counts if necessary. The patient is aware that inappropriate use will results in  cessation of prescribing such medications.    INSPECT report has been reviewed and scanned into the patient's chart.    As the clinician, I personally reviewed the INSPECT from 11/1/2023.    History and physical exam exhibit continued safe and appropriate use of controlled substances.      EMR Dragon/Transcription disclaimer:   Much of this encounter note is an electronic transcription/translation of spoken language to printed text. The electronic translation of spoken language may permit erroneous, or at times, nonsensical words or phrases to be inadvertently transcribed; Although I have reviewed the note for such errors, some may still exist.

## 2023-11-13 ENCOUNTER — TELEPHONE (OUTPATIENT)
Dept: PAIN MEDICINE | Facility: CLINIC | Age: 58
End: 2023-11-13
Payer: MEDICAID

## 2023-11-13 NOTE — TELEPHONE ENCOUNTER
Provider: HENRY    Caller: KIMBERLY    Relationship to Patient: SPOUSE    Pharmacy: [facility]    Phone Number: [phone]    Reason for Call: PT CALLING ABOUT THE PATCHES HE IS SUPPOSED TO HAVE FILLED TODAY - PLEASE CALL BACK AND ADVISE

## 2023-12-12 ENCOUNTER — TELEPHONE (OUTPATIENT)
Dept: PAIN MEDICINE | Facility: CLINIC | Age: 58
End: 2023-12-12
Payer: MEDICAID

## 2023-12-12 NOTE — TELEPHONE ENCOUNTER
Provider: HENRY    Caller: YASH    Relationship to Patient: SELF    [facility]     Phone Number: [phone]    Reason for Call: PT CALLED AND STATED THAT THE PHARMACY TOLD HIM TO CALL TO GET THE AUTH FOR HIS MEDICATION TO BE REFILLED    Buprenorphine (Butrans) 15 MCG/HR patch weekly

## 2023-12-13 RX ORDER — BUPRENORPHINE 10 UG/H
1 PATCH TRANSDERMAL WEEKLY
Qty: 4 PATCH | Refills: 0 | Status: SHIPPED | OUTPATIENT
Start: 2023-12-13

## 2023-12-13 NOTE — TELEPHONE ENCOUNTER
PATIENT'S WIFE STATES THAT THEY NEED TO HAVE DR FIGUEROA CALL THE INSURANCE COMPANY AND GIVE THE OKAY FOR THE GENERIC OF THIS PRESCRIPTION. THE [facility] HAS NOT GOTTEN THE OKAY FOR THE GENERICS AND NEED THE AUTHORIZATION FROM DR FIGUEROA.

## 2023-12-13 NOTE — TELEPHONE ENCOUNTER
Spoke to pharmacy. They are unable to get the brand name of the butrans 15mcg in at this time.  And pt's insurance denied the generic.  They do have the brand name of the 10mcg and 20mcg and wanted to know if you would want to adjust his strength.  Inspect in chart

## 2023-12-13 NOTE — TELEPHONE ENCOUNTER
That sounds good.  I explained to them that you may adjust his dose and that you prob wouldn't go higher.  It would be more likely that you would write for the 10mcg. I'll let pt know when you send it in.  Thank you

## 2023-12-13 NOTE — TELEPHONE ENCOUNTER
His insurance denied the generic yesterday and the pharmacy says they cannot get an override to fill the generic even though the brand is not available

## 2024-02-02 ENCOUNTER — OFFICE VISIT (OUTPATIENT)
Dept: PAIN MEDICINE | Facility: CLINIC | Age: 59
End: 2024-02-02
Payer: MEDICAID

## 2024-02-02 ENCOUNTER — TELEPHONE (OUTPATIENT)
Dept: PAIN MEDICINE | Facility: CLINIC | Age: 59
End: 2024-02-02
Payer: MEDICAID

## 2024-02-02 VITALS
HEART RATE: 66 BPM | OXYGEN SATURATION: 97 % | RESPIRATION RATE: 16 BRPM | SYSTOLIC BLOOD PRESSURE: 147 MMHG | DIASTOLIC BLOOD PRESSURE: 75 MMHG

## 2024-02-02 DIAGNOSIS — G89.29 CHRONIC BILATERAL LOW BACK PAIN WITH BILATERAL SCIATICA: ICD-10-CM

## 2024-02-02 DIAGNOSIS — M54.41 CHRONIC BILATERAL LOW BACK PAIN WITH BILATERAL SCIATICA: ICD-10-CM

## 2024-02-02 DIAGNOSIS — M54.42 CHRONIC BILATERAL LOW BACK PAIN WITH BILATERAL SCIATICA: ICD-10-CM

## 2024-02-02 DIAGNOSIS — Z79.899 HIGH RISK MEDICATION USE: Primary | ICD-10-CM

## 2024-02-02 DIAGNOSIS — M48.062 SPINAL STENOSIS OF LUMBAR REGION WITH NEUROGENIC CLAUDICATION: ICD-10-CM

## 2024-02-02 PROCEDURE — G0463 HOSPITAL OUTPT CLINIC VISIT: HCPCS | Performed by: STUDENT IN AN ORGANIZED HEALTH CARE EDUCATION/TRAINING PROGRAM

## 2024-02-02 RX ORDER — BUPRENORPHINE 15 UG/H
1 PATCH TRANSDERMAL WEEKLY
Qty: 4 PATCH | Refills: 0 | Status: SHIPPED | OUTPATIENT
Start: 2024-02-06

## 2024-02-02 RX ORDER — BUPRENORPHINE 15 UG/H
1 PATCH TRANSDERMAL WEEKLY
Qty: 4 PATCH | Refills: 0 | Status: SHIPPED | OUTPATIENT
Start: 2024-04-02

## 2024-02-02 RX ORDER — BUPRENORPHINE 15 UG/H
1 PATCH TRANSDERMAL WEEKLY
Qty: 4 PATCH | Refills: 0 | Status: SHIPPED | OUTPATIENT
Start: 2024-03-05

## 2024-02-02 NOTE — PROGRESS NOTES
CHIEF COMPLAINT  Chief Complaint   Patient presents with    Back Pain     LD Butrans patch on 1/30/24       Primary Care  Deni Mahoney APRN Subjective Raymond V Tristian is a 58 y.o. male  who presents for chronic low back pain and bilateral lumbar radiculopathy.  He states that he is on the pain ongoing for many years following a work injury.  He states that since that time, he has been followed by an outside pain clinic who has started him on multiple medications including Percocet and most recently, MS Contin.  He states that while he does get some relief with his current pain regimen, he feels like he has less benefit than when he was getting fentanyl patches.  He states that he was told by his previous pain provider that he could no longer get the fentanyl due to supply issues.  He presents today for establishment of care and further work-up management.  He denies any red flag symptoms, but does admit to generalized weakness in the lower extremities bilaterally.    Back Pain  Associated symptoms include weakness. Pertinent negatives include no numbness.        Location: Low back and bilateral lower extremities  Onset: Many years ago  Duration: Progressively worsening  Timing: Constant throughout the day  Quality: Sharp, stabbing pains with associated weakness  Severity: Today: 6       Last Week: 6       Worst: 10  Modifying Factors: The pain is worse with any physical activity and movement and improves with rest    Physical Therapy: no    Interval Update 02/02/2024: Doing well with his current dosage of medication.  Otherwise, no significant changes.    The following portions of the patient's history were reviewed and updated as appropriate: allergies, current medications, past family history, past medical history, past social history, past surgical history and problem list.      Current Outpatient Medications:     aspirin 325 MG tablet, Take 1 tablet by mouth Daily., Disp: , Rfl:     [START ON 4/2/2024]  Buprenorphine (Butrans) 15 MCG/HR patch weekly, Place 1 patch on the skin as directed by provider 1 (One) Time Per Week., Disp: 4 patch, Rfl: 0    [START ON 3/5/2024] Buprenorphine (Butrans) 15 MCG/HR patch weekly, Place 1 patch on the skin as directed by provider 1 (One) Time Per Week., Disp: 4 patch, Rfl: 0    lisinopril (PRINIVIL,ZESTRIL) 10 MG tablet, , Disp: , Rfl:     lisinopril (PRINIVIL,ZESTRIL) 10 MG tablet, Take 1 tablet by mouth Daily., Disp: 30 tablet, Rfl: 3    lisinopril (PRINIVIL,ZESTRIL) 10 MG tablet, Take 1 tablet by mouth Daily., Disp: 30 tablet, Rfl: 3    [START ON 2/6/2024] Buprenorphine (Butrans) 15 MCG/HR patch weekly, Place 1 patch on the skin as directed by provider 1 (One) Time Per Week., Disp: 4 patch, Rfl: 0    Review of Systems   Gastrointestinal:  Negative for constipation.   Musculoskeletal:  Positive for back pain and gait problem.   Neurological:  Positive for weakness. Negative for numbness.       Vitals:    02/02/24 1313   BP: 147/75   Pulse: 66   Resp: 16   SpO2: 97%   PainSc:   8       Urine Drug Screen: 11/3/2023  Appropriate: Negative for buprenorphine    Objective   Physical Exam  Vitals and nursing note reviewed. Exam conducted with a chaperone present.   Constitutional:       General: He is not in acute distress.     Appearance: Normal appearance. He is well-developed and normal weight.   Neck:      Trachea: No tracheal deviation.   Musculoskeletal:      Comments: Lumbar Spine Exam:  Tender to palpation over the lumbar paraspinal musculature No  Limited range of motion secondary to pain No  Facet loading positive: Equivocal  Facets tender to palpation: Equivocal  Straight leg raise test positive: Equivocal       Neurological:      General: No focal deficit present.      Mental Status: He is alert.      Sensory: No sensory deficit.      Motor: Weakness present.      Comments: Generalized global weakness           Assessment & Plan   Problems Addressed this Visit    None  Visit  Diagnoses       High risk medication use    -  Primary    Spinal stenosis of lumbar region with neurogenic claudication        Relevant Medications    Buprenorphine (Butrans) 15 MCG/HR patch weekly (Start on 4/2/2024)    Buprenorphine (Butrans) 15 MCG/HR patch weekly (Start on 3/5/2024)    Buprenorphine (Butrans) 15 MCG/HR patch weekly (Start on 2/6/2024)    Chronic bilateral low back pain with bilateral sciatica        Relevant Medications    Buprenorphine (Butrans) 15 MCG/HR patch weekly (Start on 4/2/2024)    Buprenorphine (Butrans) 15 MCG/HR patch weekly (Start on 3/5/2024)    Buprenorphine (Butrans) 15 MCG/HR patch weekly (Start on 2/6/2024)          Diagnoses         Codes Comments    High risk medication use    -  Primary ICD-10-CM: Z79.899  ICD-9-CM: V58.69     Spinal stenosis of lumbar region with neurogenic claudication     ICD-10-CM: M48.062  ICD-9-CM: 724.03     Chronic bilateral low back pain with bilateral sciatica     ICD-10-CM: M54.42, M54.41, G89.29  ICD-9-CM: 724.2, 724.3, 338.29             Plan:  His lumbar plain films are fairly unremarkable  Severe foraminal and central stenosis at L5-S1  Continue Butrans 15 mcg/h patch  Follow-up 3 months  UDS negative for buprenorphine.  Inspect appropriate  --- Follow-up 3 months           INSPECT REPORT    As part of the patient's treatment plan, I may be prescribing controlled substances. The patient has been made aware of appropriate use of such medications, including potential risk of somnolence, limited ability to drive and/or work safely, and the potential for dependence or overdose. It has also bee made clear that these medications are for use by this patient only, without concomitant use of alcohol or other substances unless prescribed.     Patient has completed prescribing agreement detailing terms of continued prescribing of controlled substances, including monitoring HANNY reports, urine drug screening, and pill counts if necessary. The patient  is aware that inappropriate use will results in cessation of prescribing such medications.    INSPECT report has been reviewed and scanned into the patient's chart.    As the clinician, I personally reviewed the INSPECT from 1/31/2024.    History and physical exam exhibit continued safe and appropriate use of controlled substances.      EMR Dragon/Transcription disclaimer:   Much of this encounter note is an electronic transcription/translation of spoken language to printed text. The electronic translation of spoken language may permit erroneous, or at times, nonsensical words or phrases to be inadvertently transcribed; Although I have reviewed the note for such errors, some may still exist.

## 2024-02-02 NOTE — TELEPHONE ENCOUNTER
Caller: KIMBERLY GAY    Relationship: Emergency Contact    Best call back number:  OR CELL: [phone]    Requested Prescriptions: Buprenorphine (Butrans) 15 MCG/HR patch weekly   Requested Prescriptions      No prescriptions requested or ordered in this encounter        Pharmacy where request should be sent: [id number] - [place], [place] - [address] - [phone]  - [phone] FX     Last office visit with prescribing clinician: 2/2/2024   Last telemedicine visit with prescribing clinician: Visit date not found   Next office visit with prescribing clinician: 4/26/2024     Does the patient have less than a 3 day supply:  [x] Yes  [] No    Would you like a call back once the refill request has been completed: [x] Yes [] No    If the office needs to give you a call back, can they leave a voicemail: [x] Yes [] No    Shawn Eduardo   02/02/24 14:14 EST

## 2024-04-08 ENCOUNTER — TELEPHONE (OUTPATIENT)
Dept: PAIN MEDICINE | Facility: CLINIC | Age: 59
End: 2024-04-08
Payer: MEDICAID

## 2024-04-08 DIAGNOSIS — M54.41 CHRONIC BILATERAL LOW BACK PAIN WITH BILATERAL SCIATICA: ICD-10-CM

## 2024-04-08 DIAGNOSIS — M54.42 CHRONIC BILATERAL LOW BACK PAIN WITH BILATERAL SCIATICA: ICD-10-CM

## 2024-04-08 DIAGNOSIS — G89.29 CHRONIC BILATERAL LOW BACK PAIN WITH BILATERAL SCIATICA: ICD-10-CM

## 2024-04-08 DIAGNOSIS — M48.062 SPINAL STENOSIS OF LUMBAR REGION WITH NEUROGENIC CLAUDICATION: ICD-10-CM

## 2024-04-08 RX ORDER — BUPRENORPHINE 15 UG/H
1 PATCH TRANSDERMAL WEEKLY
Qty: 4 PATCH | Refills: 0 | Status: CANCELLED | OUTPATIENT
Start: 2024-04-08

## 2024-04-08 NOTE — TELEPHONE ENCOUNTER
Caller: KIMBERLY GAY    Relationship: Emergency Contact    Best call back number:     Requested Prescriptions:   Requested Prescriptions     Pending Prescriptions Disp Refills    Buprenorphine (Butrans) 15 MCG/HR patch weekly 4 patch 0     Sig: Place 1 patch on the skin as directed by provider 1 (One) Time Per Week.        Pharmacy where request should be sent: [id number] - [place], [place] - [address] - [phone]  - [phone] FX     Last office visit with prescribing clinician: 2/2/2024   Last telemedicine visit with prescribing clinician: Visit date not found   Next office visit with prescribing clinician: 4/26/2024     Additional details provided by patient: PT IS OUT OF PATCHES    Does the patient have less than a 3 day supply:  [x] Yes  [] No    Would you like a call back once the refill request has been completed: [x] Yes [] No    If the office needs to give you a call back, can they leave a voicemail: [x] Yes [] No    Shawn Eduardo Rep   04/08/24 08:19 EDT

## 2024-04-08 NOTE — TELEPHONE ENCOUNTER
Called pharmacy because we are showing an active RX- pharmacist found RX. Error on Pharmacy- informed pt

## 2024-04-26 ENCOUNTER — OFFICE VISIT (OUTPATIENT)
Dept: PAIN MEDICINE | Facility: CLINIC | Age: 59
End: 2024-04-26
Payer: MEDICAID

## 2024-04-26 VITALS
RESPIRATION RATE: 16 BRPM | HEART RATE: 64 BPM | DIASTOLIC BLOOD PRESSURE: 72 MMHG | OXYGEN SATURATION: 97 % | SYSTOLIC BLOOD PRESSURE: 169 MMHG

## 2024-04-26 DIAGNOSIS — M48.062 SPINAL STENOSIS OF LUMBAR REGION WITH NEUROGENIC CLAUDICATION: Primary | ICD-10-CM

## 2024-04-26 DIAGNOSIS — M54.41 CHRONIC BILATERAL LOW BACK PAIN WITH BILATERAL SCIATICA: ICD-10-CM

## 2024-04-26 DIAGNOSIS — Z79.899 HIGH RISK MEDICATION USE: ICD-10-CM

## 2024-04-26 DIAGNOSIS — G89.29 CHRONIC BILATERAL LOW BACK PAIN WITH BILATERAL SCIATICA: ICD-10-CM

## 2024-04-26 DIAGNOSIS — M54.42 CHRONIC BILATERAL LOW BACK PAIN WITH BILATERAL SCIATICA: ICD-10-CM

## 2024-04-26 PROCEDURE — 1159F MED LIST DOCD IN RCRD: CPT | Performed by: STUDENT IN AN ORGANIZED HEALTH CARE EDUCATION/TRAINING PROGRAM

## 2024-04-26 PROCEDURE — 99214 OFFICE O/P EST MOD 30 MIN: CPT | Performed by: STUDENT IN AN ORGANIZED HEALTH CARE EDUCATION/TRAINING PROGRAM

## 2024-04-26 PROCEDURE — 1160F RVW MEDS BY RX/DR IN RCRD: CPT | Performed by: STUDENT IN AN ORGANIZED HEALTH CARE EDUCATION/TRAINING PROGRAM

## 2024-04-26 PROCEDURE — G0463 HOSPITAL OUTPT CLINIC VISIT: HCPCS | Performed by: STUDENT IN AN ORGANIZED HEALTH CARE EDUCATION/TRAINING PROGRAM

## 2024-04-26 PROCEDURE — 1125F AMNT PAIN NOTED PAIN PRSNT: CPT | Performed by: STUDENT IN AN ORGANIZED HEALTH CARE EDUCATION/TRAINING PROGRAM

## 2024-04-26 RX ORDER — BUPRENORPHINE 15 UG/H
1 PATCH TRANSDERMAL WEEKLY
Qty: 4 PATCH | Refills: 0 | Status: SHIPPED | OUTPATIENT
Start: 2024-05-31

## 2024-04-26 RX ORDER — ATORVASTATIN CALCIUM 10 MG/1
TABLET, FILM COATED ORAL
COMMUNITY
Start: 2024-04-22

## 2024-04-26 RX ORDER — BUPRENORPHINE 15 UG/H
1 PATCH TRANSDERMAL WEEKLY
Qty: 4 PATCH | Refills: 0 | Status: SHIPPED | OUTPATIENT
Start: 2024-05-03

## 2024-04-26 RX ORDER — BUPRENORPHINE 15 UG/H
1 PATCH TRANSDERMAL WEEKLY
Qty: 4 PATCH | Refills: 0 | Status: SHIPPED | OUTPATIENT
Start: 2024-06-28

## 2024-04-26 NOTE — PROGRESS NOTES
CHIEF COMPLAINT  Chief Complaint   Patient presents with    Back Pain     LD Butrans patch on 4/22/24       Primary Care  Deni Mahoney APRN Subjective Raymond V Tristian is a 58 y.o. male  who presents for chronic low back pain and bilateral lumbar radiculopathy.  He states that he is on the pain ongoing for many years following a work injury.  He states that since that time, he has been followed by an outside pain clinic who has started him on multiple medications including Percocet and most recently, MS Contin.  He states that while he does get some relief with his current pain regimen, he feels like he has less benefit than when he was getting fentanyl patches.  He states that he was told by his previous pain provider that he could no longer get the fentanyl due to supply issues.  He presents today for establishment of care and further work-up management.  He denies any red flag symptoms, but does admit to generalized weakness in the lower extremities bilaterally.    Back Pain  Associated symptoms include weakness. Pertinent negatives include no numbness.        Location: Low back and bilateral lower extremities  Onset: Many years ago  Duration: Progressively worsening  Timing: Constant throughout the day  Quality: Sharp, stabbing pains with associated weakness  Severity: Today: 6       Last Week: 6       Worst: 10  Modifying Factors: The pain is worse with any physical activity and movement and improves with rest    Physical Therapy: no    Interval Update 04/26/2024: Complaining of increased pain.  Otherwise, unchanged.  He states he is working with his PCP to stop smoking.    The following portions of the patient's history were reviewed and updated as appropriate: allergies, current medications, past family history, past medical history, past social history, past surgical history and problem list.      Current Outpatient Medications:     aspirin 325 MG tablet, Take 1 tablet by mouth Daily., Disp: , Rfl:      atorvastatin (LIPITOR) 10 MG tablet, , Disp: , Rfl:     [START ON 6/28/2024] Buprenorphine (Butrans) 15 MCG/HR patch weekly, Place 1 patch on the skin as directed by provider 1 (One) Time Per Week., Disp: 4 patch, Rfl: 0    [START ON 5/31/2024] Buprenorphine (Butrans) 15 MCG/HR patch weekly, Place 1 patch on the skin as directed by provider 1 (One) Time Per Week., Disp: 4 patch, Rfl: 0    [START ON 5/3/2024] Buprenorphine (Butrans) 15 MCG/HR patch weekly, Place 1 patch on the skin as directed by provider 1 (One) Time Per Week., Disp: 4 patch, Rfl: 0    lisinopril (PRINIVIL,ZESTRIL) 10 MG tablet, , Disp: , Rfl:     lisinopril (PRINIVIL,ZESTRIL) 10 MG tablet, Take 1 tablet by mouth Daily., Disp: 30 tablet, Rfl: 3    lisinopril (PRINIVIL,ZESTRIL) 10 MG tablet, Take 1 tablet by mouth Daily., Disp: 30 tablet, Rfl: 3    lisinopril (PRINIVIL,ZESTRIL) 10 MG tablet, Take 1 tablet by mouth Daily., Disp: 30 tablet, Rfl: 3    Review of Systems   Gastrointestinal:  Negative for constipation.   Musculoskeletal:  Positive for back pain and gait problem.   Neurological:  Positive for weakness. Negative for numbness.       Vitals:    04/26/24 1344   BP: 169/72   Pulse: 64   Resp: 16   SpO2: 97%   PainSc:   9       Urine Drug Screen: 11/3/2023  Appropriate: Negative for buprenorphine    Objective   Physical Exam  Vitals and nursing note reviewed. Exam conducted with a chaperone present.   Constitutional:       General: He is not in acute distress.     Appearance: Normal appearance. He is well-developed and normal weight.   Neck:      Trachea: No tracheal deviation.   Musculoskeletal:      Comments: Lumbar Spine Exam:  Tender to palpation over the lumbar paraspinal musculature No  Limited range of motion secondary to pain No  Facet loading positive: Equivocal  Facets tender to palpation: Equivocal  Straight leg raise test positive: Equivocal       Neurological:      General: No focal deficit present.      Mental Status: He is  alert.      Sensory: No sensory deficit.      Motor: Weakness present.      Comments: Generalized global weakness           Assessment & Plan   Problems Addressed this Visit    None  Visit Diagnoses       Spinal stenosis of lumbar region with neurogenic claudication    -  Primary    Relevant Medications    Buprenorphine (Butrans) 15 MCG/HR patch weekly (Start on 6/28/2024)    Buprenorphine (Butrans) 15 MCG/HR patch weekly (Start on 5/31/2024)    Buprenorphine (Butrans) 15 MCG/HR patch weekly (Start on 5/3/2024)    Chronic bilateral low back pain with bilateral sciatica        Relevant Medications    Buprenorphine (Butrans) 15 MCG/HR patch weekly (Start on 6/28/2024)    Buprenorphine (Butrans) 15 MCG/HR patch weekly (Start on 5/31/2024)    Buprenorphine (Butrans) 15 MCG/HR patch weekly (Start on 5/3/2024)    High risk medication use              Diagnoses         Codes Comments    Spinal stenosis of lumbar region with neurogenic claudication    -  Primary ICD-10-CM: M48.062  ICD-9-CM: 724.03     Chronic bilateral low back pain with bilateral sciatica     ICD-10-CM: M54.42, M54.41, G89.29  ICD-9-CM: 724.2, 724.3, 338.29     High risk medication use     ICD-10-CM: Z79.899  ICD-9-CM: V58.69             Plan:  His lumbar plain films are fairly unremarkable  Severe foraminal and central stenosis at L5-S1  Continue Butrans 15 mcg/h patch  Follow-up 3 months  UDS negative for buprenorphine.  Inspect appropriate  --- Follow-up 3 months           INSPECT REPORT    As part of the patient's treatment plan, I may be prescribing controlled substances. The patient has been made aware of appropriate use of such medications, including potential risk of somnolence, limited ability to drive and/or work safely, and the potential for dependence or overdose. It has also bee made clear that these medications are for use by this patient only, without concomitant use of alcohol or other substances unless prescribed.     Patient has  completed prescribing agreement detailing terms of continued prescribing of controlled substances, including monitoring HANNY reports, urine drug screening, and pill counts if necessary. The patient is aware that inappropriate use will results in cessation of prescribing such medications.    INSPECT report has been reviewed and scanned into the patient's chart.    As the clinician, I personally reviewed the INSPECT from 4/24/2024.    History and physical exam exhibit continued safe and appropriate use of controlled substances.      EMR Dragon/Transcription disclaimer:   Much of this encounter note is an electronic transcription/translation of spoken language to printed text. The electronic translation of spoken language may permit erroneous, or at times, nonsensical words or phrases to be inadvertently transcribed; Although I have reviewed the note for such errors, some may still exist.

## 2024-07-26 ENCOUNTER — OFFICE VISIT (OUTPATIENT)
Dept: PAIN MEDICINE | Facility: CLINIC | Age: 59
End: 2024-07-26
Payer: MEDICAID

## 2024-07-26 VITALS
HEART RATE: 62 BPM | DIASTOLIC BLOOD PRESSURE: 85 MMHG | SYSTOLIC BLOOD PRESSURE: 131 MMHG | RESPIRATION RATE: 16 BRPM | OXYGEN SATURATION: 97 %

## 2024-07-26 DIAGNOSIS — G89.29 CHRONIC BILATERAL LOW BACK PAIN WITH BILATERAL SCIATICA: ICD-10-CM

## 2024-07-26 DIAGNOSIS — M54.42 CHRONIC BILATERAL LOW BACK PAIN WITH BILATERAL SCIATICA: ICD-10-CM

## 2024-07-26 DIAGNOSIS — M54.41 CHRONIC BILATERAL LOW BACK PAIN WITH BILATERAL SCIATICA: ICD-10-CM

## 2024-07-26 DIAGNOSIS — M48.062 SPINAL STENOSIS OF LUMBAR REGION WITH NEUROGENIC CLAUDICATION: ICD-10-CM

## 2024-07-26 DIAGNOSIS — Z79.899 HIGH RISK MEDICATION USE: Primary | ICD-10-CM

## 2024-07-26 PROCEDURE — 1160F RVW MEDS BY RX/DR IN RCRD: CPT | Performed by: STUDENT IN AN ORGANIZED HEALTH CARE EDUCATION/TRAINING PROGRAM

## 2024-07-26 PROCEDURE — 1125F AMNT PAIN NOTED PAIN PRSNT: CPT | Performed by: STUDENT IN AN ORGANIZED HEALTH CARE EDUCATION/TRAINING PROGRAM

## 2024-07-26 PROCEDURE — 1159F MED LIST DOCD IN RCRD: CPT | Performed by: STUDENT IN AN ORGANIZED HEALTH CARE EDUCATION/TRAINING PROGRAM

## 2024-07-26 PROCEDURE — G0463 HOSPITAL OUTPT CLINIC VISIT: HCPCS | Performed by: STUDENT IN AN ORGANIZED HEALTH CARE EDUCATION/TRAINING PROGRAM

## 2024-07-26 RX ORDER — BUPRENORPHINE 15 UG/H
1 PATCH TRANSDERMAL WEEKLY
Qty: 4 PATCH | Refills: 0 | Status: SHIPPED | OUTPATIENT
Start: 2024-09-20

## 2024-07-26 RX ORDER — BUPRENORPHINE 15 UG/H
1 PATCH TRANSDERMAL WEEKLY
Qty: 4 PATCH | Refills: 0 | Status: SHIPPED | OUTPATIENT
Start: 2024-07-26

## 2024-07-26 RX ORDER — BUPRENORPHINE 15 UG/H
1 PATCH TRANSDERMAL WEEKLY
Qty: 4 PATCH | Refills: 0 | Status: SHIPPED | OUTPATIENT
Start: 2024-08-23

## 2024-10-18 ENCOUNTER — TELEPHONE (OUTPATIENT)
Dept: PAIN MEDICINE | Facility: CLINIC | Age: 59
End: 2024-10-18
Payer: MEDICAID

## 2024-10-18 ENCOUNTER — OFFICE VISIT (OUTPATIENT)
Dept: PAIN MEDICINE | Facility: CLINIC | Age: 59
End: 2024-10-18
Payer: MEDICAID

## 2024-10-18 VITALS
DIASTOLIC BLOOD PRESSURE: 78 MMHG | RESPIRATION RATE: 16 BRPM | SYSTOLIC BLOOD PRESSURE: 126 MMHG | OXYGEN SATURATION: 97 % | HEART RATE: 64 BPM

## 2024-10-18 DIAGNOSIS — M48.062 SPINAL STENOSIS OF LUMBAR REGION WITH NEUROGENIC CLAUDICATION: ICD-10-CM

## 2024-10-18 DIAGNOSIS — G89.29 CHRONIC BILATERAL LOW BACK PAIN WITH BILATERAL SCIATICA: ICD-10-CM

## 2024-10-18 DIAGNOSIS — M54.42 CHRONIC BILATERAL LOW BACK PAIN WITH BILATERAL SCIATICA: ICD-10-CM

## 2024-10-18 DIAGNOSIS — M54.41 CHRONIC BILATERAL LOW BACK PAIN WITH BILATERAL SCIATICA: ICD-10-CM

## 2024-10-18 PROCEDURE — G0463 HOSPITAL OUTPT CLINIC VISIT: HCPCS | Performed by: STUDENT IN AN ORGANIZED HEALTH CARE EDUCATION/TRAINING PROGRAM

## 2024-10-18 PROCEDURE — 1160F RVW MEDS BY RX/DR IN RCRD: CPT | Performed by: STUDENT IN AN ORGANIZED HEALTH CARE EDUCATION/TRAINING PROGRAM

## 2024-10-18 PROCEDURE — 1159F MED LIST DOCD IN RCRD: CPT | Performed by: STUDENT IN AN ORGANIZED HEALTH CARE EDUCATION/TRAINING PROGRAM

## 2024-10-18 PROCEDURE — 99214 OFFICE O/P EST MOD 30 MIN: CPT | Performed by: STUDENT IN AN ORGANIZED HEALTH CARE EDUCATION/TRAINING PROGRAM

## 2024-10-18 PROCEDURE — 1125F AMNT PAIN NOTED PAIN PRSNT: CPT | Performed by: STUDENT IN AN ORGANIZED HEALTH CARE EDUCATION/TRAINING PROGRAM

## 2024-10-18 RX ORDER — BUPRENORPHINE 15 UG/H
1 PATCH TRANSDERMAL WEEKLY
Qty: 4 PATCH | Refills: 0 | Status: SHIPPED | OUTPATIENT
Start: 2024-12-13

## 2024-10-18 RX ORDER — BUPRENORPHINE 15 UG/H
1 PATCH TRANSDERMAL WEEKLY
Qty: 4 PATCH | Refills: 0 | Status: SHIPPED | OUTPATIENT
Start: 2024-11-15

## 2024-10-18 RX ORDER — LISINOPRIL 10 MG/1
1 TABLET ORAL DAILY
COMMUNITY
Start: 2024-09-18

## 2024-10-18 RX ORDER — BUPRENORPHINE 15 UG/H
1 PATCH TRANSDERMAL WEEKLY
Qty: 4 PATCH | Refills: 0 | Status: SHIPPED | OUTPATIENT
Start: 2024-10-18

## 2024-10-18 NOTE — PROGRESS NOTES
CHIEF COMPLAINT  Chief Complaint   Patient presents with    Pain     LD Butrans patch on 10/13/24       Primary Care  Deni Mahoney APRN Subjective Raymond V Tristian is a 59 y.o. male  who presents for chronic low back pain and bilateral lumbar radiculopathy.  He states that he is on the pain ongoing for many years following a work injury.  He states that since that time, he has been followed by an outside pain clinic who has started him on multiple medications including Percocet and most recently, MS Contin.  He states that while he does get some relief with his current pain regimen, he feels like he has less benefit than when he was getting fentanyl patches.  He states that he was told by his previous pain provider that he could no longer get the fentanyl due to supply issues.  He presents today for establishment of care and further work-up management.  He denies any red flag symptoms, but does admit to generalized weakness in the lower extremities bilaterally.    Back Pain  Associated symptoms include weakness. Pertinent negatives include no numbness.   Pain  Associated symptoms include weakness. Pertinent negatives include no numbness.        Location: Low back and bilateral lower extremities  Onset: Many years ago  Duration: Progressively worsening  Timing: Constant throughout the day  Quality: Sharp, stabbing pains with associated weakness  Severity: Today: 6       Last Week: 6       Worst: 10  Modifying Factors: The pain is worse with any physical activity and movement and improves with rest    Physical Therapy: no    Interval Update 10/18/2024: Continues with the 15 mcg Butrans patch.  Reports that he fell which exacerbated his pain, but otherwise no new changes    The following portions of the patient's history were reviewed and updated as appropriate: allergies, current medications, past family history, past medical history, past social history, past surgical history and problem list.      Current  Outpatient Medications:     aspirin 325 MG tablet, Take 1 tablet by mouth Daily., Disp: , Rfl:     Buprenorphine (Butrans) 15 MCG/HR patch weekly, Place 1 patch on the skin as directed by provider 1 (One) Time Per Week., Disp: 4 patch, Rfl: 0    [START ON 11/15/2024] Buprenorphine (Butrans) 15 MCG/HR patch weekly, Place 1 patch on the skin as directed by provider 1 (One) Time Per Week., Disp: 4 patch, Rfl: 0    [START ON 12/13/2024] Buprenorphine (Butrans) 15 MCG/HR patch weekly, Place 1 patch on the skin as directed by provider 1 (One) Time Per Week., Disp: 4 patch, Rfl: 0    lisinopril (PRINIVIL,ZESTRIL) 10 MG tablet, Take 1 tablet by mouth Daily., Disp: , Rfl:     atorvastatin (LIPITOR) 10 MG tablet, , Disp: , Rfl:     Review of Systems   Gastrointestinal:  Negative for constipation.   Musculoskeletal:  Positive for back pain and gait problem.   Neurological:  Positive for weakness. Negative for numbness.       Vitals:    10/18/24 1334   BP: 126/78   Pulse: 64   Resp: 16   SpO2: 97%   PainSc:   9       Urine Drug Screen: 7/26/2024  Appropriate: Yes    Objective   Physical Exam  Vitals and nursing note reviewed. Exam conducted with a chaperone present.   Constitutional:       General: He is not in acute distress.     Appearance: Normal appearance. He is well-developed and normal weight.   Neck:      Trachea: No tracheal deviation.   Musculoskeletal:      Comments: Lumbar Spine Exam:  Tender to palpation over the lumbar paraspinal musculature No  Limited range of motion secondary to pain No  Facet loading positive: Equivocal  Facets tender to palpation: Equivocal  Straight leg raise test positive: Equivocal       Neurological:      General: No focal deficit present.      Mental Status: He is alert.      Sensory: No sensory deficit.      Motor: Weakness present.      Comments: Generalized global weakness           Assessment & Plan   Problems Addressed this Visit    None  Visit Diagnoses       Spinal stenosis of  lumbar region with neurogenic claudication        Relevant Medications    Buprenorphine (Butrans) 15 MCG/HR patch weekly    Buprenorphine (Butrans) 15 MCG/HR patch weekly (Start on 11/15/2024)    Buprenorphine (Butrans) 15 MCG/HR patch weekly (Start on 12/13/2024)    Chronic bilateral low back pain with bilateral sciatica        Relevant Medications    Buprenorphine (Butrans) 15 MCG/HR patch weekly    Buprenorphine (Butrans) 15 MCG/HR patch weekly (Start on 11/15/2024)    Buprenorphine (Butrans) 15 MCG/HR patch weekly (Start on 12/13/2024)          Diagnoses         Codes Comments    Spinal stenosis of lumbar region with neurogenic claudication     ICD-10-CM: M48.062  ICD-9-CM: 724.03     Chronic bilateral low back pain with bilateral sciatica     ICD-10-CM: M54.42, M54.41, G89.29  ICD-9-CM: 724.2, 724.3, 338.29             Plan:  His lumbar plain films are fairly unremarkable  Severe foraminal and central stenosis at L5-S1  Continue Butrans 15 mcg/h patch  Follow-up 3 months  UDS inspect appropriate  --- Follow-up 3 months           INSPECT REPORT    As part of the patient's treatment plan, I may be prescribing controlled substances. The patient has been made aware of appropriate use of such medications, including potential risk of somnolence, limited ability to drive and/or work safely, and the potential for dependence or overdose. It has also bee made clear that these medications are for use by this patient only, without concomitant use of alcohol or other substances unless prescribed.     Patient has completed prescribing agreement detailing terms of continued prescribing of controlled substances, including monitoring HANNY reports, urine drug screening, and pill counts if necessary. The patient is aware that inappropriate use will results in cessation of prescribing such medications.    INSPECT report has been reviewed and scanned into the patient's chart.    As the clinician, I personally reviewed the INSPECT  from 10/16/2024.    History and physical exam exhibit continued safe and appropriate use of controlled substances.      EMR Dragon/Transcription disclaimer:   Much of this encounter note is an electronic transcription/translation of spoken language to printed text. The electronic translation of spoken language may permit erroneous, or at times, nonsensical words or phrases to be inadvertently transcribed; Although I have reviewed the note for such errors, some may still exist.

## 2024-10-18 NOTE — TELEPHONE ENCOUNTER
Pt has already left several messages by VM within the last hour. Returned pt's call to inform him the RX was sent & confirmed by pharmacy. Left VM at pharmacy that they confirmed they received it.Informed pt by VM

## 2024-10-18 NOTE — TELEPHONE ENCOUNTER
Caller: Ayo Lubin    Relationship: Self    Best call back number:   [phone]    Requested Prescriptions:   Buprenorphine (Butrans) 15 MCG/HR patch weekly     Pharmacy where request should be sent:  [facility]     Last office visit with prescribing clinician: 10/18/2024   Last telemedicine visit with prescribing clinician: Visit date not found   Next office visit with prescribing clinician: 1/10/2025     Additional details provided by patient: WAITING AT OFFICE     Does the patient have less than a 3 day supply:  [x] Yes  [] No    Would you like a call back once the refill request has been completed: [] Yes [] No    If the office needs to give you a call back, can they leave a voicemail: [] Yes [] No    Shawn Valenzuela Rep   10/18/24 15:07 EDT

## 2025-01-09 DIAGNOSIS — M54.41 CHRONIC BILATERAL LOW BACK PAIN WITH BILATERAL SCIATICA: ICD-10-CM

## 2025-01-09 DIAGNOSIS — G89.29 CHRONIC BILATERAL LOW BACK PAIN WITH BILATERAL SCIATICA: ICD-10-CM

## 2025-01-09 DIAGNOSIS — M48.062 SPINAL STENOSIS OF LUMBAR REGION WITH NEUROGENIC CLAUDICATION: ICD-10-CM

## 2025-01-09 DIAGNOSIS — M54.42 CHRONIC BILATERAL LOW BACK PAIN WITH BILATERAL SCIATICA: ICD-10-CM

## 2025-01-09 RX ORDER — BUPRENORPHINE 15 UG/H
1 PATCH TRANSDERMAL WEEKLY
Qty: 4 PATCH | Refills: 0 | Status: SHIPPED | OUTPATIENT
Start: 2025-01-09

## 2025-01-09 NOTE — TELEPHONE ENCOUNTER
Patient snowed in  Updated DNF dates according to INSPECT  2 Rxs needed to make it until his 2/20 appt with Dr Ventura BUSTILLOS in chart

## 2025-01-09 NOTE — TELEPHONE ENCOUNTER
Caller: Ayo Lubin    Relationship: Self    Best call back number: [phone] (home)     Requested Prescriptions:   Requested Prescriptions     Pending Prescriptions Disp Refills    Buprenorphine (Butrans) 15 MCG/HR patch weekly 4 patch 0     Sig: Place 1 patch on the skin as directed by provider 1 (One) Time Per Week.        Ph[facility] sent[id number] [place], [place] - [address] - [phone] [facility] [phone] FX     Last office visit with prescribing clinician: 10/18/2024   Last telemedicine visit with prescribing clinician: Visit date not found   Next office visit with prescribing clinician: Visit date not found     Additional details provided by patient: OUT OF PATCHES NEXT WEEK - HAD AN APPT ON 1/10/25 BUT COULDN'T COME IN DUE TO WEATHER     Does the patient have less than a 3 day supply:  [] Yes  [x] No      Shawn Alexandre Rep   01/09/25 08:56 EST

## 2025-01-16 DIAGNOSIS — M54.42 CHRONIC BILATERAL LOW BACK PAIN WITH BILATERAL SCIATICA: ICD-10-CM

## 2025-01-16 DIAGNOSIS — M54.41 CHRONIC BILATERAL LOW BACK PAIN WITH BILATERAL SCIATICA: ICD-10-CM

## 2025-01-16 DIAGNOSIS — G89.29 CHRONIC BILATERAL LOW BACK PAIN WITH BILATERAL SCIATICA: ICD-10-CM

## 2025-01-16 DIAGNOSIS — M48.062 SPINAL STENOSIS OF LUMBAR REGION WITH NEUROGENIC CLAUDICATION: ICD-10-CM

## 2025-01-16 RX ORDER — BUPRENORPHINE 15 UG/H
1 PATCH TRANSDERMAL WEEKLY
Qty: 4 PATCH | Refills: 0 | Status: SHIPPED | OUTPATIENT
Start: 2025-01-16

## 2025-01-16 NOTE — TELEPHONE ENCOUNTER
Spoke to pharmacy. It looks like we sent pt's rx in with incorrect dnf dates.  Can you resend please to [facility] please?

## 2025-02-18 ENCOUNTER — TELEPHONE (OUTPATIENT)
Dept: PAIN MEDICINE | Facility: CLINIC | Age: 60
End: 2025-02-18
Payer: MEDICAID

## 2025-02-18 NOTE — TELEPHONE ENCOUNTER
PATIENT CALLED AND CONFIRMED FOR THURSDAY HE SAID IF WE CLOSE OR RESCHEDULE OR ON A DELAY PLEASE CALL HIS CELL PHONE AND LEAVE   [phone]

## 2025-02-18 NOTE — PROGRESS NOTES
Subjective   Ayo Lubin is a 59 y.o. male is here for follow up for lower back pain. Previously patient of Dr. Marquez transferring care to me due to Dr. Small's moving. New patient to me.  Last seen by Dr. Marquez on 10/18/2024.    Hx: Chronic lower back pain with bilateral leg radiculopathy.  Pain started after work injury several years ago.  He was previously seen at outside pain clinic where they had tried multiple pain medications including Percocet and MS Contin.  He also had good relief with fentanyl patches.  He was told by previous pain management that he could no longer get fentanyl patches due to supply issues and thus it is decided to switch to Dr. Marquez.  He has been maintained on Butrans patches.  Never had an interventional management or have been evaluated by neurosurgery.    Lower back pain is 8/10 on VAS, maximum 10/10.  Pain is described as sharp, stabbing, heaviness, numbness.  Pain is constant.  Worse with physical activities.  Improves with standing, sitting for long time. Very vague about his leg pain. Ankle and foot pain. Numbness and weakness in right foot.  Main complaint is axial lower back pain.       SOAPP- 4  [place] back disability scale - 85    Previous Injections:     PMH:   Hypertension, smoker-0.5 PPD, back surgery - 1988.     Current Medications:   Butrans patch 15 mcg/hour weekly  Aspirin 325 mg      Past Medications:    Past Modalities:  TENS:       no          Physical Therapy Within The Last 6 Months     no  Psychotherapy     no  Massage Therapy      no    Patient Complains Of:  Uro-Fecal Incontinence no  Weight Gain/Loss  no  Fever/Chills   no  Weakness   no      PEG Assessment   What number best describes your pain on average in the past week?7  What number best describes how, during the past week, pain has interfered with your enjoyment of life?7  What number best describes how, during the past week, pain has interfered with your general activity?  8    PHQ-9  Depression Screening  Little interest or pleasure in doing things? Not at all   Feeling down, depressed, or hopeless? Several days   PHQ-2 Total Score 1   Trouble falling or staying asleep, or sleeping too much? Almost all   Feeling tired or having little energy? Almost all   Poor appetite or overeating? Not at all   Feeling bad about yourself - or that you are a failure or have let yourself or your family down? Not at all   Trouble concentrating on things, such as reading the newspaper or watching television? Several days   Moving or speaking so slowly that other people could have noticed? Or the opposite - being so fidgety or restless that you have been moving around a lot more than usual? Several days   Thoughts that you would be better off dead, or of hurting yourself in some way? Not at all   PHQ-9 Total Score 9   If you checked off any problems, how difficult have these problems made it for you to do your work, take care of things at home, or get along with other people? Somewhat difficult        Patient screened positive for depression based on a PHQ-9 score of 9 on 2/20/2025. Follow-up recommendations include: Suicide Risk Assessment performed.        Current Outpatient Medications:     aspirin 325 MG tablet, Take 1 tablet by mouth Daily., Disp: , Rfl:     atorvastatin (LIPITOR) 10 MG tablet, , Disp: , Rfl:     Buprenorphine (Butrans) 15 MCG/HR patch weekly, Place 1 patch on the skin as directed by provider 1 (One) Time Per Week., Disp: 4 patch, Rfl: 0    Buprenorphine (Butrans) 15 MCG/HR patch weekly, Place 1 patch on the skin as directed by provider 1 (One) Time Per Week., Disp: 4 patch, Rfl: 0    [START ON 3/13/2025] Buprenorphine (Butrans) 15 MCG/HR patch weekly, Place 1 patch on the skin as directed by provider 1 (One) Time Per Week for 28 days., Disp: 4 patch, Rfl: 0    [START ON 4/10/2025] Buprenorphine (Butrans) 15 MCG/HR patch weekly, Place 1 patch on the skin as directed by provider 1 (One) Time  Per Week for 28 days., Disp: 4 patch, Rfl: 0    [START ON 5/8/2025] Buprenorphine (Butrans) 15 MCG/HR patch weekly, Place 1 patch on the skin as directed by provider 1 (One) Time Per Week for 28 days., Disp: 4 patch, Rfl: 0    lisinopril (PRINIVIL,ZESTRIL) 10 MG tablet, Take 1 tablet by mouth Daily., Disp: , Rfl:     The following portions of the patient's history were reviewed and updated as appropriate: allergies, current medications, past family history, past medical history, past social history, past surgical history, and problem list.      REVIEW OF PERTINENT MEDICAL DATA    Past Medical History:   Diagnosis Date    GERD (gastroesophageal reflux disease)     Low back pain      Past Surgical History:   Procedure Laterality Date    BACK SURGERY  1988 1989     Family History   Problem Relation Age of Onset    Diabetes Mother     No Known Problems Father      Social History     Socioeconomic History    Marital status:    Tobacco Use    Smoking status: Every Day     Current packs/day: 0.50     Average packs/day: 0.5 packs/day for 46.0 years (23.0 ttl pk-yrs)     Types: Cigarettes    Smokeless tobacco: Never   Vaping Use    Vaping status: Never Used   Substance and Sexual Activity    Alcohol use: Yes     Comment: OCCASIONALLY     Drug use: Never    Sexual activity: Defer         Review of Systems   Musculoskeletal:  Positive for arthralgias and back pain.         Vitals:    02/20/25 1310   BP: 131/85   Pulse: 61   Resp: 16   SpO2: 97%   Weight: 74.8 kg (165 lb)   PainSc: 8          Objective   Physical Exam  Musculoskeletal:         General: Tenderness present.        Legs:            Imaging Reviewed:  MRI lumbar spine without contrast- 2/9/2022  - T12-L1; L1-2-mild facet arthritis  L2-3-moderate facet arthritis  L3-4-moderate facet arthropathy with ligamentum flavum thickening and mild canal stenosis.  Mild to moderate bilateral inferior neuroforaminal stenosis  L4-5-severe bilateral facet arthropathy  with ligamentum flavum thickening.  Moderate to severe canal stenosis.  Severe right neuroforaminal stenosis and moderate to severe left neuroforaminal stenosis  D9-V2-yinusqz fusion across the disc space.  Complete sacralization of L5 vertebral body.  No significant canal stenosis.    Assessment:    1. Lumbar spondylosis    2. Spinal stenosis of lumbar region with neurogenic claudication    3. Chronic bilateral low back pain with bilateral sciatica    4. High risk medication use         Plan:   1.Repeat UDS-2/20/2025.  Last UDS on 7/26/2024 and 11/3/2023 and consistent with patient's interview and negative for buprenorphine.  2. We discussed trying a course of formal physical therapy.  Physical therapy can help strengthen and stretch the muscles around the joints. Continue to be as active as possible. Patient has done PT in past.   3. Reviewed Dr. Small's notes, imaging and other physician's notes.   4.  Continue Butrans patches-10/month (3/13; 4/10; 5/8).  Side effects were discussed.  5.  Lumbar MRI was discussed with both patient and wife.  Discussed with patient that he may benefit from bilateral L3-5 MBB.  I also offered possibility of sedation for the injection but patient states that he does not want to do anything to do with needles.      RTC in 3 months.    Kenneth Whitehead DO  Pain Management   [facility]     Greater than 40 minutes was spent with the patient, reviewing records, reviewing images, performing the exam, discussing diagnosis and further treatment options, etc., and greater than 50% was spent on education      INSPECT REPORT    As part of the patient's treatment plan, I may be prescribing controlled substances. The patient has been made aware of appropriate use of such medications, including potential risk of somnolence, limited ability to drive and/or work safely, and the potential for dependence or overdose. It has also been made clear that these medications are for use by this patient  only, without concomitant use of alcohol or other substances unless prescribed.     Patient has completed prescribing agreement detailing terms of continued prescribing of controlled substances, including monitoring INSPECT reports, urine drug screening, and pill counts if necessary. The patient is aware that inappropriate use will results in cessation of prescribing such medications.    INSPECT report has been reviewed and scanned into the patient's chart.      EMR Dragon/Transcription Disclaimer:   Much of this encounter note is an electronic transcription/translation of spoken language to printed text. The electronic translation of spoken language may permit erroneous, or at times, nonsensical words or phrases to be inadvertently transcribed; Although I have reviewed the note for such errors, some may still exist.

## 2025-02-20 ENCOUNTER — TELEPHONE (OUTPATIENT)
Dept: PAIN MEDICINE | Facility: CLINIC | Age: 60
End: 2025-02-20
Payer: MEDICAID

## 2025-02-20 ENCOUNTER — OFFICE VISIT (OUTPATIENT)
Dept: PAIN MEDICINE | Facility: CLINIC | Age: 60
End: 2025-02-20
Payer: MEDICAID

## 2025-02-20 VITALS
WEIGHT: 165 LBS | DIASTOLIC BLOOD PRESSURE: 85 MMHG | SYSTOLIC BLOOD PRESSURE: 131 MMHG | RESPIRATION RATE: 16 BRPM | OXYGEN SATURATION: 97 % | HEART RATE: 61 BPM | BODY MASS INDEX: 27.46 KG/M2

## 2025-02-20 DIAGNOSIS — M54.42 CHRONIC BILATERAL LOW BACK PAIN WITH BILATERAL SCIATICA: ICD-10-CM

## 2025-02-20 DIAGNOSIS — M48.062 SPINAL STENOSIS OF LUMBAR REGION WITH NEUROGENIC CLAUDICATION: ICD-10-CM

## 2025-02-20 DIAGNOSIS — M54.41 CHRONIC BILATERAL LOW BACK PAIN WITH BILATERAL SCIATICA: ICD-10-CM

## 2025-02-20 DIAGNOSIS — Z79.899 HIGH RISK MEDICATION USE: ICD-10-CM

## 2025-02-20 DIAGNOSIS — M47.816 LUMBAR SPONDYLOSIS: Primary | ICD-10-CM

## 2025-02-20 DIAGNOSIS — G89.29 CHRONIC BILATERAL LOW BACK PAIN WITH BILATERAL SCIATICA: ICD-10-CM

## 2025-02-20 RX ORDER — BUPRENORPHINE 15 UG/H
1 PATCH TRANSDERMAL WEEKLY
Qty: 4 PATCH | Refills: 0 | Status: SHIPPED | OUTPATIENT
Start: 2025-05-08 | End: 2025-06-05

## 2025-02-20 RX ORDER — BUPRENORPHINE 15 UG/H
1 PATCH TRANSDERMAL WEEKLY
Qty: 4 PATCH | Refills: 0 | Status: SHIPPED | OUTPATIENT
Start: 2025-03-13 | End: 2025-04-10

## 2025-02-20 RX ORDER — BUPRENORPHINE 15 UG/H
1 PATCH TRANSDERMAL WEEKLY
Qty: 4 PATCH | Refills: 0 | Status: SHIPPED | OUTPATIENT
Start: 2025-04-10 | End: 2025-05-08
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
## 2025-02-20 NOTE — TELEPHONE ENCOUNTER
Name: Ayo Lubin      Relationship: Self      Best Callback Number: [phone]      HUB PROVIDED THE RELAY MESSAGE FROM THE OFFICE      PATIENT: VOICED UNDERSTANDING AND HAS NO FURTHER QUESTIONS AT THIS TIME    ADDITIONAL INFORMATION: N/A

## 2025-03-13 ENCOUNTER — TELEPHONE (OUTPATIENT)
Dept: PAIN MEDICINE | Facility: CLINIC | Age: 60
End: 2025-03-13
Payer: MEDICAID

## 2025-03-13 NOTE — TELEPHONE ENCOUNTER
Caller: KIMBERLY GAY    Relationship: Emergency Contact    Best call back number: [phone] (home)       Requested Prescriptions: Buprenorphine (Butrans) 15 MCG/HR patch weekly   Requested Prescriptions      No prescriptions requested or ordered in this encounter        [facility]    Last office visit with prescribing clinician: 2/20/2025   Last telemedicine visit with prescribing clinician: Visit date not found   Next office visit with prescribing clinician: 5/15/2025     Additional details provided by patient: LAST PATCH APPLIED 3.6.25    Does the patient have less than a 3 day supply:  [x] Yes  [] No    Would you like a call back once the refill request has been completed: [x] Yes [] No    If the office needs to give you a call back, can they leave a voicemail: [] Yes [] No    Shawn Wilcox Rep   03/13/25 08:41 EDT

## 2025-03-13 NOTE — TELEPHONE ENCOUNTER
RX was sent in at pt's visit and they just need to reach out to the pharmacy.  Wife notified and verbalized understanding.

## 2025-04-03 ENCOUNTER — TELEPHONE (OUTPATIENT)
Dept: PAIN MEDICINE | Facility: CLINIC | Age: 60
End: 2025-04-03
Payer: MEDICAID

## 2025-04-03 NOTE — TELEPHONE ENCOUNTER
PLEASE DISREGARD - TEL ENC NOT NEEDED     PATIENT's WIFE KIMBERLY CALLED TO REQUEST REFILL OF BUTRANS 15 MCG PATCH (ONE PATCH WEEKLY)     BUT AFTER STARTING TEL ENC, HUB GENT SAW & INFORMED PATIENT THAT SCRIPTS WERE SENT & RECEIVED BY [employer] 02-20-25 FOR 03/13, 4/10, & 5/08     PATIENT's WIFE KIMBERLY INFORMED TO CALL [employer] TO ASK ABOUT REFILL STATUS & TO HAVE PHARMACY CALL US IF ANY ISSUES    THANKS

## 2025-05-14 NOTE — PROGRESS NOTES
Subjective   Ayo Lubin is a 59 y.o. male is here for follow up for lower back pain.  Patient was last seen on 2/20/2025.  Last UDS inconsistent with patient's interview as he was delayed in filling his script.    On last visit-    Lower back pain is 8/10 on VAS, maximum 10/10.  Pain is described as sharp, stabbing, heaviness, numbness.  Pain is constant.  Worse with physical activities.  Improves with standing, sitting for long time. Very vague about his leg pain. Ankle and foot pain. Numbness and weakness in right foot.  Main complaint is axial lower back pain.       SOAPP- 4  [place] back disability scale - 85    Previous Injections:     Hx: Previously patient of Dr. Marquez transferring care to me due to Dr. Small's moving. New patient to me.  Last seen by Dr. Marquez on 10/18/2024.Chronic lower back pain with bilateral leg radiculopathy.  Pain started after work injury several years ago.  He was previously seen at outside pain clinic where they had tried multiple pain medications including Percocet and MS Contin.  He also had good relief with fentanyl patches.  He was told by previous pain management that he could no longer get fentanyl patches due to supply issues and thus it is decided to switch to Dr. Marquez.  He has been maintained on Butrans patches.  Never had an interventional management or have been evaluated by neurosurgery.    PMH:   Hypertension, smoker-0.5 PPD, back surgery - 1988.     Current Medications:   Butrans patch 15 mcg/hour weekly  Aspirin 325 mg      Past Medications:    Past Modalities:  TENS:       no          Physical Therapy Within The Last 6 Months     no  Psychotherapy     no  Massage Therapy      no    Patient Complains Of:  Uro-Fecal Incontinence no  Weight Gain/Loss  no  Fever/Chills   no  Weakness   no      PEG Assessment   What number best describes your pain on average in the past week?7  What number best describes how, during the past week, pain has interfered with  your enjoyment of life?7  What number best describes how, during the past week, pain has interfered with your general activity?  8    PHQ-9 Depression Screening  Little interest or pleasure in doing things? Not at all   Feeling down, depressed, or hopeless? Not at all   PHQ-2 Total Score 0   Trouble falling or staying asleep, or sleeping too much?     Feeling tired or having little energy?     Poor appetite or overeating?     Feeling bad about yourself - or that you are a failure or have let yourself or your family down?     Trouble concentrating on things, such as reading the newspaper or watching television?     Moving or speaking so slowly that other people could have noticed? Or the opposite - being so fidgety or restless that you have been moving around a lot more than usual?     Thoughts that you would be better off dead, or of hurting yourself in some way?     PHQ-9 Total Score     If you checked off any problems, how difficult have these problems made it for you to do your work, take care of things at home, or get along with other people? Not difficult at all        Patient screened positive for depression based on a PHQ-9 score of 9 on 2/20/2025. Follow-up recommendations include: Suicide Risk Assessment performed.        Current Outpatient Medications:     aspirin 325 MG tablet, Take 1 tablet by mouth Daily., Disp: , Rfl:     atorvastatin (LIPITOR) 10 MG tablet, , Disp: , Rfl:     Buprenorphine (Butrans) 15 MCG/HR patch weekly, Place 1 patch on the skin as directed by provider 1 (One) Time Per Week for 28 days., Disp: 4 patch, Rfl: 0    lisinopril (PRINIVIL,ZESTRIL) 10 MG tablet, Take 1 tablet by mouth Daily., Disp: , Rfl:     The following portions of the patient's history were reviewed and updated as appropriate: allergies, current medications, past family history, past medical history, past social history, past surgical history, and problem list.      REVIEW OF PERTINENT MEDICAL DATA    Past Medical  History:   Diagnosis Date    GERD (gastroesophageal reflux disease)     Low back pain      Past Surgical History:   Procedure Laterality Date    BACK SURGERY  1988 1989     Family History   Problem Relation Age of Onset    Diabetes Mother     No Known Problems Father      Social History     Socioeconomic History    Marital status:    Tobacco Use    Smoking status: Every Day     Current packs/day: 0.50     Average packs/day: 0.5 packs/day for 46.0 years (23.0 ttl pk-yrs)     Types: Cigarettes    Smokeless tobacco: Never   Vaping Use    Vaping status: Never Used   Substance and Sexual Activity    Alcohol use: Yes     Comment: OCCASIONALLY     Drug use: Never    Sexual activity: Defer         Review of Systems   Musculoskeletal:  Positive for arthralgias and back pain.         Vitals:    05/15/25 1305   BP: 131/77   Pulse: 66   Resp: 16   SpO2: 96%   Weight: 74.8 kg (165 lb)   PainSc: 8            Objective   Physical Exam  Musculoskeletal:         General: Tenderness present.        Legs:            Imaging Reviewed:  MRI lumbar spine without contrast- 2/9/2022  - T12-L1; L1-2-mild facet arthritis  L2-3-moderate facet arthritis  L3-4-moderate facet arthropathy with ligamentum flavum thickening and mild canal stenosis.  Mild to moderate bilateral inferior neuroforaminal stenosis  L4-5-severe bilateral facet arthropathy with ligamentum flavum thickening.  Moderate to severe canal stenosis.  Severe right neuroforaminal stenosis and moderate to severe left neuroforaminal stenosis  [id number] fusion across the disc space.  Complete sacralization of L5 vertebral body.  No significant canal stenosis.    Assessment:    1. High risk medication use    2. Spinal stenosis of lumbar region with neurogenic claudication    3. Chronic bilateral low back pain with bilateral sciatica        Plan:   Repeat UDS - 5/15/25. UDS on 2/20/2025 is inconsistent with patient's interview and negative for buprenorphine. He had trouble  getting it from pharmacy.  Last UDS on 7/26/2024 and 11/3/2023 and consistent with patient's interview and negative for buprenorphine.  2. We discussed trying a course of formal physical therapy.  Physical therapy can help strengthen and stretch the muscles around the joints. Continue to be as active as possible. Patient has done PT in past.   3. Reviewed Dr. Small's notes, imaging and other physician's notes.   4.  Continue Butrans patches-10/month (3/13; 4/10; 5/8).  Side effects were discussed.  5.  Start Meloxicam 15 mg daily PRN. Patient informed of increased risk of heart attacks, stroke and kidney problems in addition to gastric ulcers with use of non-steroidal anti-inflammatory medications.  6.  Lumbar MRI was discussed with both patient and wife.  Discussed with patient that he may benefit from bilateral L3-5 MBB.  I also offered possibility of sedation for the injection but patient states that he does not want to do anything to do with needles.      RTC in 3 months.    Kenneth Whitehead DO  Pain Management   [facility]       INSPECT REPORT    As part of the patient's treatment plan, I may be prescribing controlled substances. The patient has been made aware of appropriate use of such medications, including potential risk of somnolence, limited ability to drive and/or work safely, and the potential for dependence or overdose. It has also been made clear that these medications are for use by this patient only, without concomitant use of alcohol or other substances unless prescribed.     Patient has completed prescribing agreement detailing terms of continued prescribing of controlled substances, including monitoring INSPECT reports, urine drug screening, and pill counts if necessary. The patient is aware that inappropriate use will results in cessation of prescribing such medications.    INSPECT report has been reviewed and scanned into the patient's chart.      EMR Dragon/Transcription Disclaimer:   Much  of this encounter note is an electronic transcription/translation of spoken language to printed text. The electronic translation of spoken language may permit erroneous, or at times, nonsensical words or phrases to be inadvertently transcribed; Although I have reviewed the note for such errors, some may still exist.

## 2025-05-15 ENCOUNTER — OFFICE VISIT (OUTPATIENT)
Dept: PAIN MEDICINE | Facility: CLINIC | Age: 60
End: 2025-05-15
Payer: MEDICAID

## 2025-05-15 VITALS
RESPIRATION RATE: 16 BRPM | HEART RATE: 66 BPM | SYSTOLIC BLOOD PRESSURE: 131 MMHG | WEIGHT: 165 LBS | BODY MASS INDEX: 27.46 KG/M2 | DIASTOLIC BLOOD PRESSURE: 77 MMHG | OXYGEN SATURATION: 96 %

## 2025-05-15 DIAGNOSIS — M48.062 SPINAL STENOSIS OF LUMBAR REGION WITH NEUROGENIC CLAUDICATION: ICD-10-CM

## 2025-05-15 DIAGNOSIS — M54.41 CHRONIC BILATERAL LOW BACK PAIN WITH BILATERAL SCIATICA: ICD-10-CM

## 2025-05-15 DIAGNOSIS — G89.29 CHRONIC BILATERAL LOW BACK PAIN WITH BILATERAL SCIATICA: ICD-10-CM

## 2025-05-15 DIAGNOSIS — M54.42 CHRONIC BILATERAL LOW BACK PAIN WITH BILATERAL SCIATICA: ICD-10-CM

## 2025-05-15 DIAGNOSIS — Z79.899 HIGH RISK MEDICATION USE: Primary | ICD-10-CM

## 2025-05-15 RX ORDER — BUPRENORPHINE 15 UG/H
1 PATCH TRANSDERMAL WEEKLY
Qty: 4 PATCH | Refills: 0 | Status: SHIPPED | OUTPATIENT
Start: 2025-07-02 | End: 2025-07-30

## 2025-05-15 RX ORDER — MELOXICAM 15 MG/1
15 TABLET ORAL DAILY PRN
Qty: 30 TABLET | Refills: 1 | Status: SHIPPED | OUTPATIENT
Start: 2025-05-15 | End: 2025-07-14

## 2025-05-15 RX ORDER — BUPRENORPHINE 15 UG/H
1 PATCH TRANSDERMAL WEEKLY
Qty: 4 PATCH | Refills: 0 | Status: SHIPPED | OUTPATIENT
Start: 2025-06-04 | End: 2025-07-02

## 2025-07-23 NOTE — PROGRESS NOTES
Subjective   Ayo Lubin is a 60 y.o. male is here for follow up for lower back pain.  Patient was last seen on 5/25/2025. No  significant  changes in physical exam since last visit.     On last visit-started meloxicam- hasn't started since he had questions regarding medications.     Lower back pain is 8/10 on VAS, maximum 10/10.  Pain is described as sharp, stabbing, heaviness, numbness.  Pain is constant.  Worse with physical activities.  Improves with standing, sitting for long time. Very vague about his leg pain. Ankle and foot pain. Numbness and weakness in right foot.  Main complaint is axial lower back pain.       SOAPP- 4  [place] back disability scale - 85    Previous Injections:     Hx: Previously patient of Dr. Marquez transferring care to me due to Dr. Small's moving. New patient to me.  Last seen by Dr. Marquez on 10/18/2024.Chronic lower back pain with bilateral leg radiculopathy.  Pain started after work injury several years ago.  He was previously seen at outside pain clinic where they had tried multiple pain medications including Percocet and MS Contin.  He also had good relief with fentanyl patches.  He was told by previous pain management that he could no longer get fentanyl patches due to supply issues and thus it is decided to switch to Dr. Marquez.  He has been maintained on Butrans patches.  Never had an interventional management or have been evaluated by neurosurgery.    PMH:   Hypertension, smoker-0.5 PPD, back surgery - 1988.     Current Medications:   Butrans patch 15 mcg/hour weekly  Aspirin 325 mg      Past Medications:    Past Modalities:  TENS:       no          Physical Therapy Within The Last 6 Months     no  Psychotherapy     no  Massage Therapy      no    Patient Complains Of:  Uro-Fecal Incontinence no  Weight Gain/Loss  no  Fever/Chills   no  Weakness   no      PEG Assessment   What number best describes your pain on average in the past week?7  What number best describes  how, during the past week, pain has interfered with your enjoyment of life?7  What number best describes how, during the past week, pain has interfered with your general activity?  8    PHQ-9 Depression Screening  Little interest or pleasure in doing things? Not at all   Feeling down, depressed, or hopeless? Not at all   PHQ-2 Total Score 0   Trouble falling or staying asleep, or sleeping too much?     Feeling tired or having little energy?     Poor appetite or overeating?     Feeling bad about yourself - or that you are a failure or have let yourself or your family down?     Trouble concentrating on things, such as reading the newspaper or watching television?     Moving or speaking so slowly that other people could have noticed? Or the opposite - being so fidgety or restless that you have been moving around a lot more than usual?     Thoughts that you would be better off dead, or of hurting yourself in some way?     PHQ-9 Total Score     If you checked off any problems, how difficult have these problems made it for you to do your work, take care of things at home, or get along with other people? Not difficult at all        Patient screened positive for depression based on a PHQ-9 score of 9 on 2/20/2025. Follow-up recommendations include: Suicide Risk Assessment performed.        Current Outpatient Medications:     aspirin 325 MG tablet, Take 1 tablet by mouth Daily., Disp: , Rfl:     atorvastatin (LIPITOR) 10 MG tablet, , Disp: , Rfl:     [START ON 7/30/2025] Buprenorphine (Butrans) 15 MCG/HR patch weekly, Place 1 patch on the skin as directed by provider 1 (One) Time Per Week for 28 days., Disp: 4 patch, Rfl: 0    [START ON 8/27/2025] Buprenorphine (Butrans) 15 MCG/HR patch weekly, Place 1 patch on the skin as directed by provider 1 (One) Time Per Week for 28 days., Disp: 4 patch, Rfl: 0    [START ON 9/24/2025] Buprenorphine (Butrans) 15 MCG/HR patch weekly, Place 1 patch on the skin as directed by provider 1  (One) Time Per Week for 28 days., Disp: 4 patch, Rfl: 0    lisinopril (PRINIVIL,ZESTRIL) 10 MG tablet, Take 1 tablet by mouth Daily., Disp: , Rfl:     meloxicam (MOBIC) 15 MG tablet, TAKE 1 TABLET BY MOUTH DAILY AS NEEDED FOR MODERATE PAIN FOR UP TO 60 DAYS., Disp: , Rfl:     The following portions of the patient's history were reviewed and updated as appropriate: allergies, current medications, past family history, past medical history, past social history, past surgical history, and problem list.      REVIEW OF PERTINENT MEDICAL DATA    Past Medical History:   Diagnosis Date    GERD (gastroesophageal reflux disease)     Low back pain      Past Surgical History:   Procedure Laterality Date    BACK SURGERY  1988 1989     Family History   Problem Relation Age of Onset    Diabetes Mother     No Known Problems Father      Social History     Socioeconomic History    Marital status:    Tobacco Use    Smoking status: Every Day     Current packs/day: 0.50     Average packs/day: 0.5 packs/day for 46.0 years (23.0 ttl pk-yrs)     Types: Cigarettes    Smokeless tobacco: Never   Vaping Use    Vaping status: Never Used   Substance and Sexual Activity    Alcohol use: Yes     Comment: OCCASIONALLY     Drug use: Never    Sexual activity: Defer         Review of Systems   Musculoskeletal:  Positive for arthralgias and back pain.         Vitals:    07/24/25 1300   BP: 121/82   Pulse: 68   Resp: 16   SpO2: 97%   Weight: 76.7 kg (169 lb)   PainSc: 8              Objective   Physical Exam  Musculoskeletal:         General: Tenderness present.        Legs:            Imaging Reviewed:  MRI lumbar spine without contrast- 2/9/2022  - T12-L1; L1-2-mild facet arthritis  L2-3-moderate facet arthritis  L3-4-moderate facet arthropathy with ligamentum flavum thickening and mild canal stenosis.  Mild to moderate bilateral inferior neuroforaminal stenosis  L4-5-severe bilateral facet arthropathy with ligamentum flavum thickening.   Moderate to severe canal stenosis.  Severe right neuroforaminal stenosis and moderate to severe left neuroforaminal stenosis  [id number] fusion across the disc space.  Complete sacralization of L5 vertebral body.  No significant canal stenosis.    Assessment:    1. Lumbar spondylosis    2. Spinal stenosis of lumbar region with neurogenic claudication    3. Chronic bilateral low back pain with bilateral sciatica    4. High risk medication use          Plan:   UDS on 5/15/2025 is consistent with patient's interview.  UDS on 2/20/2025 is inconsistent with patient's interview and negative for buprenorphine. He had trouble getting it from pharmacy.  Last UDS on 7/26/2024 and 11/3/2023 and consistent with patient's interview and negative for buprenorphine.  2. We discussed trying a course of formal physical therapy.  Physical therapy can help strengthen and stretch the muscles around the joints. Continue to be as active as possible. Patient has done PT in past.   3. Reviewed Dr. Small's notes, imaging and other physician's notes.   4.  Continue Butrans patches-10/month (7/30; 8/27; 9/24).  Side effects were discussed.  5.  Start Meloxicam 15 mg daily PRN. Patient informed of increased risk of heart attacks, stroke and kidney problems in addition to gastric ulcers with use of non-steroidal anti-inflammatory medications.  6.  Lumbar MRI was discussed with both patient and wife.  Discussed with patient that he may benefit from bilateral L3-5 MBB.  I also offered possibility of sedation for the injection but patient states that he does not want to do anything to do with needles.      RTC in 3 months.    Kenneth Whitehead DO  Pain Management   [facility]       INSPECT REPORT    As part of the patient's treatment plan, I may be prescribing controlled substances. The patient has been made aware of appropriate use of such medications, including potential risk of somnolence, limited ability to drive and/or work safely, and the  potential for dependence or overdose. It has also been made clear that these medications are for use by this patient only, without concomitant use of alcohol or other substances unless prescribed.     Patient has completed prescribing agreement detailing terms of continued prescribing of controlled substances, including monitoring INSPECT reports, urine drug screening, and pill counts if necessary. The patient is aware that inappropriate use will results in cessation of prescribing such medications.    INSPECT report has been reviewed and scanned into the patient's chart.      EMR Dragon/Transcription Disclaimer:   Much of this encounter note is an electronic transcription/translation of spoken language to printed text. The electronic translation of spoken language may permit erroneous, or at times, nonsensical words or phrases to be inadvertently transcribed; Although I have reviewed the note for such errors, some may still exist.

## 2025-07-24 ENCOUNTER — OFFICE VISIT (OUTPATIENT)
Dept: PAIN MEDICINE | Facility: CLINIC | Age: 60
End: 2025-07-24
Payer: MEDICAID

## 2025-07-24 VITALS
WEIGHT: 169 LBS | SYSTOLIC BLOOD PRESSURE: 121 MMHG | HEART RATE: 68 BPM | OXYGEN SATURATION: 97 % | RESPIRATION RATE: 16 BRPM | DIASTOLIC BLOOD PRESSURE: 82 MMHG | BODY MASS INDEX: 28.12 KG/M2

## 2025-07-24 DIAGNOSIS — M47.816 LUMBAR SPONDYLOSIS: Primary | ICD-10-CM

## 2025-07-24 DIAGNOSIS — G89.29 CHRONIC BILATERAL LOW BACK PAIN WITH BILATERAL SCIATICA: ICD-10-CM

## 2025-07-24 DIAGNOSIS — M48.062 SPINAL STENOSIS OF LUMBAR REGION WITH NEUROGENIC CLAUDICATION: ICD-10-CM

## 2025-07-24 DIAGNOSIS — M54.42 CHRONIC BILATERAL LOW BACK PAIN WITH BILATERAL SCIATICA: ICD-10-CM

## 2025-07-24 DIAGNOSIS — M54.41 CHRONIC BILATERAL LOW BACK PAIN WITH BILATERAL SCIATICA: ICD-10-CM

## 2025-07-24 DIAGNOSIS — Z79.899 HIGH RISK MEDICATION USE: ICD-10-CM

## 2025-07-24 RX ORDER — BUPRENORPHINE 15 UG/H
1 PATCH TRANSDERMAL WEEKLY
Qty: 4 PATCH | Refills: 0 | Status: SHIPPED | OUTPATIENT
Start: 2025-09-24 | End: 2025-10-22

## 2025-07-24 RX ORDER — BUPRENORPHINE 15 UG/H
1 PATCH TRANSDERMAL WEEKLY
Qty: 4 PATCH | Refills: 0 | Status: SHIPPED | OUTPATIENT
Start: 2025-08-27 | End: 2025-09-24

## 2025-07-24 RX ORDER — BUPRENORPHINE 15 UG/H
1 PATCH TRANSDERMAL WEEKLY
Qty: 4 PATCH | Refills: 0 | Status: SHIPPED | OUTPATIENT
Start: 2025-07-30 | End: 2025-08-27

## 2025-07-24 RX ORDER — MELOXICAM 15 MG/1
TABLET ORAL
COMMUNITY
Start: 2025-06-30

## 2025-07-28 RX ORDER — MELOXICAM 15 MG/1
TABLET ORAL
Qty: 30 TABLET | Refills: 1 | OUTPATIENT
Start: 2025-07-28